# Patient Record
Sex: FEMALE | Race: ASIAN | NOT HISPANIC OR LATINO | Employment: FULL TIME | URBAN - METROPOLITAN AREA
[De-identification: names, ages, dates, MRNs, and addresses within clinical notes are randomized per-mention and may not be internally consistent; named-entity substitution may affect disease eponyms.]

---

## 2022-10-27 ENCOUNTER — TELEPHONE (OUTPATIENT)
Dept: SCHEDULING | Facility: IMAGING CENTER | Age: 42
End: 2022-10-27

## 2022-11-04 SDOH — HEALTH STABILITY: PHYSICAL HEALTH: ON AVERAGE, HOW MANY DAYS PER WEEK DO YOU ENGAGE IN MODERATE TO STRENUOUS EXERCISE (LIKE A BRISK WALK)?: 3 DAYS

## 2022-11-04 SDOH — ECONOMIC STABILITY: HOUSING INSECURITY: IN THE LAST 12 MONTHS, HOW MANY PLACES HAVE YOU LIVED?: 1

## 2022-11-04 SDOH — ECONOMIC STABILITY: INCOME INSECURITY: HOW HARD IS IT FOR YOU TO PAY FOR THE VERY BASICS LIKE FOOD, HOUSING, MEDICAL CARE, AND HEATING?: NOT HARD AT ALL

## 2022-11-04 SDOH — ECONOMIC STABILITY: TRANSPORTATION INSECURITY
IN THE PAST 12 MONTHS, HAS LACK OF RELIABLE TRANSPORTATION KEPT YOU FROM MEDICAL APPOINTMENTS, MEETINGS, WORK OR FROM GETTING THINGS NEEDED FOR DAILY LIVING?: NO

## 2022-11-04 SDOH — ECONOMIC STABILITY: TRANSPORTATION INSECURITY
IN THE PAST 12 MONTHS, HAS THE LACK OF TRANSPORTATION KEPT YOU FROM MEDICAL APPOINTMENTS OR FROM GETTING MEDICATIONS?: NO

## 2022-11-04 SDOH — ECONOMIC STABILITY: INCOME INSECURITY: IN THE LAST 12 MONTHS, WAS THERE A TIME WHEN YOU WERE NOT ABLE TO PAY THE MORTGAGE OR RENT ON TIME?: NO

## 2022-11-04 SDOH — ECONOMIC STABILITY: HOUSING INSECURITY
IN THE LAST 12 MONTHS, WAS THERE A TIME WHEN YOU DID NOT HAVE A STEADY PLACE TO SLEEP OR SLEPT IN A SHELTER (INCLUDING NOW)?: NO

## 2022-11-04 SDOH — HEALTH STABILITY: PHYSICAL HEALTH: ON AVERAGE, HOW MANY MINUTES DO YOU ENGAGE IN EXERCISE AT THIS LEVEL?: 30 MIN

## 2022-11-04 SDOH — ECONOMIC STABILITY: FOOD INSECURITY: WITHIN THE PAST 12 MONTHS, YOU WORRIED THAT YOUR FOOD WOULD RUN OUT BEFORE YOU GOT MONEY TO BUY MORE.: NEVER TRUE

## 2022-11-04 SDOH — ECONOMIC STABILITY: TRANSPORTATION INSECURITY
IN THE PAST 12 MONTHS, HAS LACK OF TRANSPORTATION KEPT YOU FROM MEETINGS, WORK, OR FROM GETTING THINGS NEEDED FOR DAILY LIVING?: NO

## 2022-11-04 SDOH — HEALTH STABILITY: MENTAL HEALTH
STRESS IS WHEN SOMEONE FEELS TENSE, NERVOUS, ANXIOUS, OR CAN'T SLEEP AT NIGHT BECAUSE THEIR MIND IS TROUBLED. HOW STRESSED ARE YOU?: ONLY A LITTLE

## 2022-11-04 SDOH — ECONOMIC STABILITY: FOOD INSECURITY: WITHIN THE PAST 12 MONTHS, THE FOOD YOU BOUGHT JUST DIDN'T LAST AND YOU DIDN'T HAVE MONEY TO GET MORE.: NEVER TRUE

## 2022-11-04 ASSESSMENT — SOCIAL DETERMINANTS OF HEALTH (SDOH)
HOW OFTEN DO YOU HAVE SIX OR MORE DRINKS ON ONE OCCASION: NEVER
DO YOU BELONG TO ANY CLUBS OR ORGANIZATIONS SUCH AS CHURCH GROUPS UNIONS, FRATERNAL OR ATHLETIC GROUPS, OR SCHOOL GROUPS?: NO
HOW OFTEN DO YOU HAVE A DRINK CONTAINING ALCOHOL: 2-4 TIMES A MONTH
HOW HARD IS IT FOR YOU TO PAY FOR THE VERY BASICS LIKE FOOD, HOUSING, MEDICAL CARE, AND HEATING?: NOT HARD AT ALL
DO YOU BELONG TO ANY CLUBS OR ORGANIZATIONS SUCH AS CHURCH GROUPS UNIONS, FRATERNAL OR ATHLETIC GROUPS, OR SCHOOL GROUPS?: NO
HOW OFTEN DO YOU GET TOGETHER WITH FRIENDS OR RELATIVES?: TWICE A WEEK
IN A TYPICAL WEEK, HOW MANY TIMES DO YOU TALK ON THE PHONE WITH FAMILY, FRIENDS, OR NEIGHBORS?: TWICE A WEEK
HOW MANY DRINKS CONTAINING ALCOHOL DO YOU HAVE ON A TYPICAL DAY WHEN YOU ARE DRINKING: 1 OR 2
HOW OFTEN DO YOU GET TOGETHER WITH FRIENDS OR RELATIVES?: TWICE A WEEK
HOW OFTEN DO YOU ATTENT MEETINGS OF THE CLUB OR ORGANIZATION YOU BELONG TO?: NEVER
IN A TYPICAL WEEK, HOW MANY TIMES DO YOU TALK ON THE PHONE WITH FAMILY, FRIENDS, OR NEIGHBORS?: TWICE A WEEK
HOW OFTEN DO YOU ATTENT MEETINGS OF THE CLUB OR ORGANIZATION YOU BELONG TO?: NEVER
WITHIN THE PAST 12 MONTHS, YOU WORRIED THAT YOUR FOOD WOULD RUN OUT BEFORE YOU GOT THE MONEY TO BUY MORE: NEVER TRUE
HOW OFTEN DO YOU ATTEND CHURCH OR RELIGIOUS SERVICES?: NEVER
HOW OFTEN DO YOU ATTEND CHURCH OR RELIGIOUS SERVICES?: NEVER

## 2022-11-04 ASSESSMENT — LIFESTYLE VARIABLES
HOW OFTEN DO YOU HAVE SIX OR MORE DRINKS ON ONE OCCASION: NEVER
HOW OFTEN DO YOU HAVE A DRINK CONTAINING ALCOHOL: 2-4 TIMES A MONTH
AUDIT-C TOTAL SCORE: 2
HOW MANY STANDARD DRINKS CONTAINING ALCOHOL DO YOU HAVE ON A TYPICAL DAY: 1 OR 2
SKIP TO QUESTIONS 9-10: 1

## 2022-11-07 ENCOUNTER — OFFICE VISIT (OUTPATIENT)
Dept: MEDICAL GROUP | Facility: MEDICAL CENTER | Age: 42
End: 2022-11-07
Payer: COMMERCIAL

## 2022-11-07 VITALS
BODY MASS INDEX: 25.39 KG/M2 | HEART RATE: 78 BPM | DIASTOLIC BLOOD PRESSURE: 62 MMHG | TEMPERATURE: 98.4 F | OXYGEN SATURATION: 95 % | HEIGHT: 63 IN | WEIGHT: 143.3 LBS | SYSTOLIC BLOOD PRESSURE: 102 MMHG

## 2022-11-07 DIAGNOSIS — Z01.84 IMMUNITY STATUS TESTING: ICD-10-CM

## 2022-11-07 DIAGNOSIS — R53.83 OTHER FATIGUE: ICD-10-CM

## 2022-11-07 DIAGNOSIS — F32.0 CURRENT MILD EPISODE OF MAJOR DEPRESSIVE DISORDER WITHOUT PRIOR EPISODE (HCC): ICD-10-CM

## 2022-11-07 DIAGNOSIS — Z13.220 SCREENING FOR LIPID DISORDERS: ICD-10-CM

## 2022-11-07 DIAGNOSIS — R07.89 STERNAL PAIN: ICD-10-CM

## 2022-11-07 DIAGNOSIS — Z13.1 SCREENING FOR DIABETES MELLITUS: ICD-10-CM

## 2022-11-07 DIAGNOSIS — Z11.59 NEED FOR HEPATITIS C SCREENING TEST: ICD-10-CM

## 2022-11-07 PROBLEM — F32.1 CURRENT MODERATE EPISODE OF MAJOR DEPRESSIVE DISORDER WITHOUT PRIOR EPISODE (HCC): Status: ACTIVE | Noted: 2022-11-07

## 2022-11-07 PROCEDURE — 99204 OFFICE O/P NEW MOD 45 MIN: CPT | Performed by: FAMILY MEDICINE

## 2022-11-07 RX ORDER — BUPROPION HYDROCHLORIDE 300 MG/1
300 TABLET ORAL EVERY MORNING
COMMUNITY

## 2022-11-07 ASSESSMENT — ENCOUNTER SYMPTOMS
FOCAL WEAKNESS: 0
FEVER: 0
ABDOMINAL PAIN: 0
DIARRHEA: 0
CONSTIPATION: 0
VOMITING: 0
CHILLS: 0
BLOOD IN STOOL: 0
COUGH: 0
SHORTNESS OF BREATH: 0
NAUSEA: 0
SORE THROAT: 0
HEADACHES: 0
WEAKNESS: 0
WHEEZING: 0
PALPITATIONS: 0

## 2022-11-07 ASSESSMENT — PATIENT HEALTH QUESTIONNAIRE - PHQ9
7. TROUBLE CONCENTRATING ON THINGS, SUCH AS READING THE NEWSPAPER OR WATCHING TELEVISION: SEVERAL DAYS
SUM OF ALL RESPONSES TO PHQ QUESTIONS 1-9: 2
CLINICAL INTERPRETATION OF PHQ2 SCORE: 0
2. FEELING DOWN, DEPRESSED, IRRITABLE, OR HOPELESS: NOT AT ALL
1. LITTLE INTEREST OR PLEASURE IN DOING THINGS: NOT AT ALL
5. POOR APPETITE OR OVEREATING: NOT AT ALL
6. FEELING BAD ABOUT YOURSELF - OR THAT YOU ARE A FAILURE OR HAVE LET YOURSELF OR YOUR FAMILY DOWN: NOT AL ALL
8. MOVING OR SPEAKING SO SLOWLY THAT OTHER PEOPLE COULD HAVE NOTICED. OR THE OPPOSITE, BEING SO FIGETY OR RESTLESS THAT YOU HAVE BEEN MOVING AROUND A LOT MORE THAN USUAL: NOT AT ALL
4. FEELING TIRED OR HAVING LITTLE ENERGY: SEVERAL DAYS
SUM OF ALL RESPONSES TO PHQ9 QUESTIONS 1 AND 2: 0
9. THOUGHTS THAT YOU WOULD BE BETTER OFF DEAD, OR OF HURTING YOURSELF: NOT AT ALL
3. TROUBLE FALLING OR STAYING ASLEEP OR SLEEPING TOO MUCH: NOT AT ALL

## 2022-11-07 NOTE — ASSESSMENT & PLAN NOTE
Chronic problem, stable, continue Wellbutrin 300 mg daily, continue to follow-up with psychiatry for management of this condition.

## 2022-11-07 NOTE — PROGRESS NOTES
FAMILY MEDICINE VISIT                                                               Chief complaint::Diagnoses of Current mild episode of major depressive disorder without prior episode (HCC), Sternal pain, Other fatigue, Screening for diabetes mellitus, Screening for lipid disorders, Immunity status testing, and Need for hepatitis C screening test were pertinent to this visit.    History of present illness: Charlette Gomez is a 42 y.o. female who presented to establish care, fatigue, sternal pain.      Problem   Current Mild Episode of Major Depressive Disorder Without Prior Episode (Hcc)    She is currently seeing psychiatry for depression management.  She is on Wellbutrin 300 mg daily.  She is doing well with this medication.  No side effects with this medication.    Depression Screening    Little interest or pleasure in doing things?  0 - not at all  Feeling down, depressed , or hopeless? 0 - not at all  Patient Health Questionnaire Score: 0    If depressive symptoms identified deferred to follow up visit unless specifically addressed in assesment and plan.      Interpretation of PHQ-9 Total Score   Score Severity   1-4 Minimal Depression   5-9 Mild Depression   10-14 Moderate Depression   15-19 Moderately Severe Depression   20-27 Severe Depression       Sternal Pain    She reports that she got COVID 3 months ago.  She has been experiencing sternal pain will sometimes when she takes deep breaths.  Reports that sometimes she gets pain if she palpates that area.  Denies any direct trauma to her chest area.  No other associated symptoms.  No previous lung problems.     Other Fatigue    She reports that she has history of vitamin D deficiency.  She has fatigue symptoms.  Would like to get checked for blood work.  Has family history of thyroid disease in mother          Review of systems:     Review of Systems   Constitutional:  Positive for malaise/fatigue. Negative for chills and fever.   HENT:  Negative for  "congestion, ear discharge, ear pain, hearing loss and sore throat.    Respiratory:  Negative for cough, shortness of breath and wheezing.    Cardiovascular:  Negative for chest pain, palpitations and leg swelling.   Gastrointestinal:  Negative for abdominal pain, blood in stool, constipation, diarrhea, nausea and vomiting.   Musculoskeletal:         Sternal pain   Skin:  Negative for rash.   Neurological:  Negative for focal weakness, weakness and headaches.      Medications and Allergies:     Current Outpatient Medications   Medication Sig Dispense Refill    buPROPion (WELLBUTRIN XL) 300 MG XL tablet Take 1 Tablet by mouth every morning.       No current facility-administered medications for this visit.          Vitals:    /62 (BP Location: Left arm, Patient Position: Sitting, BP Cuff Size: Adult)   Pulse 78   Temp 36.9 °C (98.4 °F) (Temporal)   Ht 1.6 m (5' 3\")   Wt 65 kg (143 lb 4.8 oz)   SpO2 95%  Body mass index is 25.38 kg/m².    Physical Exam:     Physical Exam  Constitutional:       General: She is not in acute distress.     Appearance: She is not diaphoretic.   HENT:      Head: Normocephalic and atraumatic.      Right Ear: Tympanic membrane, ear canal and external ear normal.      Left Ear: Tympanic membrane, ear canal and external ear normal.      Nose: Nose normal.      Mouth/Throat:      Mouth: Mucous membranes are moist.      Pharynx: No oropharyngeal exudate or posterior oropharyngeal erythema.   Eyes:      General:         Right eye: No discharge.         Left eye: No discharge.      Conjunctiva/sclera: Conjunctivae normal.   Neck:      Thyroid: No thyromegaly.   Cardiovascular:      Rate and Rhythm: Normal rate and regular rhythm.      Heart sounds: Normal heart sounds. No murmur heard.  Pulmonary:      Effort: Pulmonary effort is normal. No respiratory distress.      Breath sounds: Normal breath sounds. No wheezing or rales.   Musculoskeletal:         General: No deformity. Normal range " of motion.      Cervical back: Neck supple.   Skin:     General: Skin is warm.      Findings: No rash.   Neurological:      General: No focal deficit present.      Mental Status: She is alert. Mental status is at baseline.      Gait: Gait is intact.   Psychiatric:         Mood and Affect: Mood and affect normal.         Judgment: Judgment normal.          Assessment/Plan:         Problem List Items Addressed This Visit       Sternal pain     New problem, check chest x-ray as she recently had COVID.  Symptoms are consistent with costochondritis.  If pain gets worse, can take ibuprofen that will help with these symptoms         Relevant Orders    DX-CHEST-2 VIEWS    Other fatigue     New problem, uncontrolled, check vitamin D, metabolic panel, thyroid function test.  We will recommend vitamin supplementation based on these results         Relevant Orders    TSH WITH REFLEX TO FT4    VITAMIN D,25 HYDROXY (DEFICIENCY)    Current mild episode of major depressive disorder without prior episode (HCC)     Chronic problem, stable, continue Wellbutrin 300 mg daily, continue to follow-up with psychiatry for management of this condition.         Relevant Medications    buPROPion (WELLBUTRIN XL) 300 MG XL tablet     Other Visit Diagnoses       Screening for diabetes mellitus        Relevant Orders    HEMOGLOBIN A1C    Screening for lipid disorders        Relevant Orders    Comp Metabolic Panel    Lipid Profile    Immunity status testing        Relevant Orders    HEP B SURFACE AB    Need for hepatitis C screening test        Relevant Orders    HEP C VIRUS ANTIBODY           Discussed with patient about due vaccines, will give tetanus vaccine at next visit.  Had Pap smear done, request records.    Please note that this dictation was created using voice recognition software. I have made every reasonable attempt to correct obvious errors, but I expect that there are errors of grammar and possibly content that I did not discover  before finalizing the note.    Follow up in 2 months for annual physical and lab follow-up

## 2022-11-07 NOTE — ASSESSMENT & PLAN NOTE
New problem, uncontrolled, check vitamin D, metabolic panel, thyroid function test.  We will recommend vitamin supplementation based on these results

## 2022-11-07 NOTE — ASSESSMENT & PLAN NOTE
New problem, check chest x-ray as she recently had COVID.  Symptoms are consistent with costochondritis.  If pain gets worse, can take ibuprofen that will help with these symptoms

## 2022-11-07 NOTE — LETTER
[facility]  Jared Barrios M.D.  [address]  [place] [address]  Fax: [phone]   Authorization for Release/Disclosure of   Protected Health Information   Name: LAURIE VEE : 1980 SSN: [id number]   Address: [address] [place] [address] [place] [address] Phone:    [phone] (work)   I authorize the entity listed below to release/disclose the PHI below to:   [facility]/Jared Barrios M.D. and Jared Barrios M.D.   Provider or Entity Name:     Address   City, State, Zip   Phone:      Fax:     Reason for request: continuity of care   Information to be released:    [  ] LAST COLONOSCOPY,  including any PATH REPORT and follow-up  [  ] LAST FIT/COLOGUARD RESULT [  ] LAST DEXA  [  ] LAST MAMMOGRAM  [  ] LAST PAP  [  ] LAST LABS [  ] RETINA EXAM REPORT  [  ] IMMUNIZATION RECORDS  [  ] Release all info      [  ] Check here and initial the line next to each item to release ALL health information INCLUDING  _____ Care and treatment for drug and / or alcohol abuse  _____ HIV testing, infection status, or AIDS  _____ Genetic Testing    DATES OF SERVICE OR TIME PERIOD TO BE DISCLOSED: _____________  I understand and acknowledge that:  * This Authorization may be revoked at any time by you in writing, except if your health information has already been used or disclosed.  * Your health information that will be used or disclosed as a result of you signing this authorization could be re-disclosed by the recipient. If this occurs, your re-disclosed health information may no longer be protected by State or Federal laws.  * You may refuse to sign this Authorization. Your refusal will not affect your ability to obtain treatment.  * This Authorization becomes effective upon signing and will  on (date) __________.      If no date is indicated, this Authorization will  one (1) year from the signature date.    Name: Laurie Vee    Signature:   Date:     2022       PLEASE FAX REQUESTED  RECORDS BACK TO: [phone]

## 2022-12-30 ENCOUNTER — HOSPITAL ENCOUNTER (OUTPATIENT)
Dept: LAB | Facility: MEDICAL CENTER | Age: 42
End: 2022-12-30
Attending: FAMILY MEDICINE
Payer: COMMERCIAL

## 2022-12-30 DIAGNOSIS — Z13.1 SCREENING FOR DIABETES MELLITUS: ICD-10-CM

## 2022-12-30 DIAGNOSIS — Z11.59 NEED FOR HEPATITIS C SCREENING TEST: ICD-10-CM

## 2022-12-30 DIAGNOSIS — Z01.84 IMMUNITY STATUS TESTING: ICD-10-CM

## 2022-12-30 DIAGNOSIS — Z13.220 SCREENING FOR LIPID DISORDERS: ICD-10-CM

## 2022-12-30 DIAGNOSIS — R53.83 OTHER FATIGUE: ICD-10-CM

## 2022-12-30 LAB
25(OH)D3 SERPL-MCNC: 24 NG/ML (ref 30–100)
ALBUMIN SERPL BCP-MCNC: 4.3 G/DL (ref 3.2–4.9)
ALBUMIN/GLOB SERPL: 1.2 G/DL
ALP SERPL-CCNC: 72 U/L (ref 30–99)
ALT SERPL-CCNC: 16 U/L (ref 2–50)
ANION GAP SERPL CALC-SCNC: 8 MMOL/L (ref 7–16)
AST SERPL-CCNC: 18 U/L (ref 12–45)
BILIRUB SERPL-MCNC: 0.7 MG/DL (ref 0.1–1.5)
BUN SERPL-MCNC: 12 MG/DL (ref 8–22)
CALCIUM ALBUM COR SERPL-MCNC: 9.5 MG/DL (ref 8.5–10.5)
CALCIUM SERPL-MCNC: 9.7 MG/DL (ref 8.5–10.5)
CHLORIDE SERPL-SCNC: 103 MMOL/L (ref 96–112)
CHOLEST SERPL-MCNC: 157 MG/DL (ref 100–199)
CO2 SERPL-SCNC: 27 MMOL/L (ref 20–33)
CREAT SERPL-MCNC: 0.68 MG/DL (ref 0.5–1.4)
EST. AVERAGE GLUCOSE BLD GHB EST-MCNC: 111 MG/DL
GFR SERPLBLD CREATININE-BSD FMLA CKD-EPI: 112 ML/MIN/1.73 M 2
GLOBULIN SER CALC-MCNC: 3.6 G/DL (ref 1.9–3.5)
GLUCOSE SERPL-MCNC: 95 MG/DL (ref 65–99)
HBA1C MFR BLD: 5.5 % (ref 4–5.6)
HBV SURFACE AB SERPL IA-ACNC: ABNORMAL MIU/ML (ref 0–10)
HCV AB SER QL: NORMAL
HDLC SERPL-MCNC: 57 MG/DL
LDLC SERPL CALC-MCNC: 88 MG/DL
POTASSIUM SERPL-SCNC: 4.4 MMOL/L (ref 3.6–5.5)
PROT SERPL-MCNC: 7.9 G/DL (ref 6–8.2)
SODIUM SERPL-SCNC: 138 MMOL/L (ref 135–145)
TRIGL SERPL-MCNC: 62 MG/DL (ref 0–149)
TSH SERPL DL<=0.005 MIU/L-ACNC: 3.08 UIU/ML (ref 0.38–5.33)

## 2022-12-30 PROCEDURE — 86803 HEPATITIS C AB TEST: CPT

## 2022-12-30 PROCEDURE — 82306 VITAMIN D 25 HYDROXY: CPT

## 2022-12-30 PROCEDURE — 80053 COMPREHEN METABOLIC PANEL: CPT

## 2022-12-30 PROCEDURE — 86706 HEP B SURFACE ANTIBODY: CPT

## 2022-12-30 PROCEDURE — 80061 LIPID PANEL: CPT

## 2022-12-30 PROCEDURE — 84443 ASSAY THYROID STIM HORMONE: CPT

## 2022-12-30 PROCEDURE — 83036 HEMOGLOBIN GLYCOSYLATED A1C: CPT

## 2022-12-30 PROCEDURE — 36415 COLL VENOUS BLD VENIPUNCTURE: CPT

## 2023-06-17 ENCOUNTER — HOSPITAL ENCOUNTER (OUTPATIENT)
Dept: RADIOLOGY | Facility: MEDICAL CENTER | Age: 43
End: 2023-06-17
Attending: FAMILY MEDICINE
Payer: COMMERCIAL

## 2023-06-17 DIAGNOSIS — R07.89 STERNAL PAIN: ICD-10-CM

## 2023-06-17 PROCEDURE — 71046 X-RAY EXAM CHEST 2 VIEWS: CPT

## 2023-07-03 ENCOUNTER — OFFICE VISIT (OUTPATIENT)
Dept: MEDICAL GROUP | Facility: MEDICAL CENTER | Age: 43
End: 2023-07-03
Payer: COMMERCIAL

## 2023-07-03 VITALS
HEART RATE: 90 BPM | WEIGHT: 142.09 LBS | HEIGHT: 63 IN | DIASTOLIC BLOOD PRESSURE: 50 MMHG | OXYGEN SATURATION: 100 % | SYSTOLIC BLOOD PRESSURE: 90 MMHG | TEMPERATURE: 98.1 F | BODY MASS INDEX: 25.18 KG/M2

## 2023-07-03 DIAGNOSIS — M79.672 PAIN IN BOTH FEET: ICD-10-CM

## 2023-07-03 DIAGNOSIS — M79.671 PAIN IN BOTH FEET: ICD-10-CM

## 2023-07-03 DIAGNOSIS — Z13.1 SCREENING FOR DIABETES MELLITUS: ICD-10-CM

## 2023-07-03 DIAGNOSIS — E55.9 VITAMIN D INSUFFICIENCY: ICD-10-CM

## 2023-07-03 DIAGNOSIS — Z11.4 SCREENING FOR HIV (HUMAN IMMUNODEFICIENCY VIRUS): ICD-10-CM

## 2023-07-03 DIAGNOSIS — Z13.220 SCREENING FOR LIPID DISORDERS: ICD-10-CM

## 2023-07-03 DIAGNOSIS — Z12.31 ENCOUNTER FOR SCREENING MAMMOGRAM FOR MALIGNANT NEOPLASM OF BREAST: ICD-10-CM

## 2023-07-03 DIAGNOSIS — Z23 NEED FOR VACCINATION: ICD-10-CM

## 2023-07-03 DIAGNOSIS — Z00.00 ANNUAL PHYSICAL EXAM: ICD-10-CM

## 2023-07-03 DIAGNOSIS — N92.6 IRREGULAR MENSTRUAL CYCLE: ICD-10-CM

## 2023-07-03 PROCEDURE — 99214 OFFICE O/P EST MOD 30 MIN: CPT | Mod: 25 | Performed by: FAMILY MEDICINE

## 2023-07-03 PROCEDURE — 3074F SYST BP LT 130 MM HG: CPT | Performed by: FAMILY MEDICINE

## 2023-07-03 PROCEDURE — 3078F DIAST BP <80 MM HG: CPT | Performed by: FAMILY MEDICINE

## 2023-07-03 PROCEDURE — 90715 TDAP VACCINE 7 YRS/> IM: CPT | Performed by: FAMILY MEDICINE

## 2023-07-03 PROCEDURE — 99396 PREV VISIT EST AGE 40-64: CPT | Mod: 25 | Performed by: FAMILY MEDICINE

## 2023-07-03 PROCEDURE — 90471 IMMUNIZATION ADMIN: CPT | Performed by: FAMILY MEDICINE

## 2023-07-03 ASSESSMENT — ENCOUNTER SYMPTOMS
EYE DISCHARGE: 0
DEPRESSION: 0
CHILLS: 0
ABDOMINAL PAIN: 0
CONSTIPATION: 0
VOMITING: 0
NAUSEA: 0
MYALGIAS: 0
EYE PAIN: 0
SHORTNESS OF BREATH: 0
HEADACHES: 0
COUGH: 0
NERVOUS/ANXIOUS: 0
DIZZINESS: 0
SINUS PAIN: 0
DIARRHEA: 0
SENSORY CHANGE: 0
HEMOPTYSIS: 0
PALPITATIONS: 0
FEVER: 0
FOCAL WEAKNESS: 0
WHEEZING: 0
EYE REDNESS: 0
SPUTUM PRODUCTION: 0

## 2023-07-03 ASSESSMENT — PATIENT HEALTH QUESTIONNAIRE - PHQ9: CLINICAL INTERPRETATION OF PHQ2 SCORE: 0

## 2023-07-03 NOTE — PROGRESS NOTES
43 y.o. female for annual routine physical exam, labs, pain in both feet, sometimes irregular menstrual cycles    Chief Complaint.  Chief Complaint   Patient presents with    Annual Exam       History of present illness:      Her last vitamin D level came back at 24.  She is taking multivitamins.  She is getting irregular menstrual cycles sometimes, would like to get checked for hormone levels.  She is having pain in both feet but she has flat feet.  Reports that when she does stationary bike then her feet are hurting more.  Denies any direct trauma to her feet.  Would like to see specialist for this.    Last Pap Smear: April 22  History of abnormal pap smears.  None  Gyn Surgery: none  Sexual history: currently sexually active    Her menstrual cycles are sometimes irregular    Health Maintenance  Advanced directive: n/a  Osteoporosis Screen/ DEXA: n/a  PT/vit D for falls prevention: n/a   Cholesterol Screening:   Lab Results   Component Value Date/Time    CHOLSTRLTOT 157 12/30/2022 0915    TRIGLYCERIDE 62 12/30/2022 0915    HDL 57 12/30/2022 0915    LDL 88 12/30/2022 0915      Diabetes Screening:   Lab Results   Component Value Date/Time    HBA1C 5.5 12/30/2022 09:15 AM       AAA Screening (65 to 74 year male): n/a   Aspirin Use: N/A    Below anticipatory guidance discussed patient  Diet: Counseled regarding eating healthy diet  Exercise: Counseled regarding exercise  Substance Abuse: None  Safe in relationship.  Yes  Seat belts, bike helmet, gun safety discussed.  Sun protection use discussed    Cancer screening  Colorectal Cancer Screening: Discussed colon cancer screening start at age 45  Lung Cancer Screening: She does not smoke  Cervical Cancer Screening: Up-to-date for Pap smear, repeat in 3 years  Breast Cancer Screening: Ordered mammogram      Infectious disease screening/Immunizations  --STI Screening: none  --HIV Screening: Ordered HIV test  --Hepatitis C Screening: Last hep C test  "negative  --Immunizations: Tdap vaccination given today.        Review of systems:    Review of Systems   Constitutional:  Negative for chills, fever and malaise/fatigue.   HENT:  Negative for ear discharge, ear pain, hearing loss and sinus pain.    Eyes:  Negative for pain, discharge and redness.   Respiratory:  Negative for cough, hemoptysis, sputum production, shortness of breath and wheezing.    Cardiovascular:  Negative for chest pain, palpitations and leg swelling.   Gastrointestinal:  Negative for abdominal pain, constipation, diarrhea, nausea and vomiting.   Genitourinary:  Negative for dysuria, frequency and urgency.   Musculoskeletal:  Negative for joint pain and myalgias.        Foot pain   Skin:  Negative for itching and rash.   Neurological:  Negative for dizziness, sensory change, focal weakness and headaches.   Psychiatric/Behavioral:  Negative for depression. The patient is not nervous/anxious.         Medications and Allergies:     Current Outpatient Medications   Medication Sig Dispense Refill    buPROPion (WELLBUTRIN XL) 300 MG XL tablet Take 1 Tablet by mouth every morning.       No current facility-administered medications for this visit.        No Known Allergies    Vitals:    BP 90/50 (BP Location: Left arm, Patient Position: Sitting, BP Cuff Size: Adult)   Pulse 90   Temp 36.7 °C (98.1 °F) (Temporal)   Ht 1.6 m (5' 3\")   Wt 64.4 kg (142 lb 1.4 oz)   SpO2 100%  Body mass index is 25.17 kg/m².    Physical Exam:   Physical Exam  Constitutional:       Appearance: Normal appearance. She is well-developed and well-groomed.   HENT:      Head: Normocephalic and atraumatic.      Right Ear: Tympanic membrane, ear canal and external ear normal.      Left Ear: Tympanic membrane, ear canal and external ear normal.      Nose: Nose normal.      Mouth/Throat:      Mouth: Mucous membranes are moist.      Pharynx: No oropharyngeal exudate or posterior oropharyngeal erythema.   Eyes:      General:         " Right eye: No discharge.         Left eye: No discharge.      Conjunctiva/sclera: Conjunctivae normal.   Neck:      Thyroid: No thyromegaly.   Cardiovascular:      Rate and Rhythm: Normal rate and regular rhythm.      Heart sounds: Normal heart sounds. No murmur heard.  Pulmonary:      Effort: Pulmonary effort is normal. No respiratory distress.      Breath sounds: Normal breath sounds. No wheezing or rales.   Abdominal:      General: Bowel sounds are normal. There is no distension.      Palpations: Abdomen is soft.      Tenderness: There is no abdominal tenderness. There is no guarding.   Musculoskeletal:         General: Normal range of motion.      Cervical back: Neck supple.      Right lower leg: No edema.      Left lower leg: No edema.   Skin:     General: Skin is warm.      Findings: No rash.   Neurological:      General: No focal deficit present.      Mental Status: She is alert. Mental status is at baseline.      Gait: Gait is intact.   Psychiatric:         Mood and Affect: Mood and affect normal.         Behavior: Behavior normal.          Labs:  I reviewed recent labs (dated: 12/30/2022) with patient.     Assessment/Plan:    Charlette was seen today for annual exam.    Diagnoses and all orders for this visit:    Annual physical exam  Ordered mammogram, up-to-date for Pap smear, Tdap vaccination given today.  Anticipatory guidance as above in note.    Screening for diabetes mellitus  -     HEMOGLOBIN A1C; Future  -     Comp Metabolic Panel; Future    Screening for lipid disorders  -     Lipid Profile; Future    Need for vaccination  -     Tdap =>8yo IM    Encounter for screening mammogram for malignant neoplasm of breast  -     MA-SCREENING MAMMO BILAT W/TOMOSYNTHESIS W/CAD; Future    Screening for HIV (human immunodeficiency virus)  -     HIV AG/AB COMBO ASSAY SCREENING; Future    Vitamin D insufficiency:  New problem, unstable, recommended to take vitamin D 2000 international units daily.  -     VITAMIN D,25  HYDROXY (DEFICIENCY); Future    Irregular menstrual cycle  New problem, check estradiol and FSH level.  -     ESTRADIOL; Future  -     FSH; Future    Pain in both feet  New problem, unstable, referral to podiatry for further evaluation.  Pain is likely due to flat feet.  She will benefit from foot insoles.    -     Referral to Podiatry        Follow up in 1 year for annual physical, sooner as needed.

## 2023-12-27 ENCOUNTER — HOSPITAL ENCOUNTER (OUTPATIENT)
Dept: RADIOLOGY | Facility: MEDICAL CENTER | Age: 43
End: 2023-12-27
Attending: FAMILY MEDICINE
Payer: COMMERCIAL

## 2023-12-27 DIAGNOSIS — Z12.31 ENCOUNTER FOR SCREENING MAMMOGRAM FOR MALIGNANT NEOPLASM OF BREAST: ICD-10-CM

## 2023-12-27 PROCEDURE — 77063 BREAST TOMOSYNTHESIS BI: CPT | Mod: 50

## 2024-07-06 ENCOUNTER — PATIENT MESSAGE (OUTPATIENT)
Dept: MEDICAL GROUP | Facility: MEDICAL CENTER | Age: 44
End: 2024-07-06
Payer: COMMERCIAL

## 2024-07-06 DIAGNOSIS — E55.9 VITAMIN D INSUFFICIENCY: ICD-10-CM

## 2024-07-06 DIAGNOSIS — Z11.4 SCREENING FOR HIV (HUMAN IMMUNODEFICIENCY VIRUS): ICD-10-CM

## 2024-07-06 DIAGNOSIS — N92.6 IRREGULAR MENSTRUAL CYCLE: ICD-10-CM

## 2024-07-06 DIAGNOSIS — Z13.220 SCREENING FOR LIPID DISORDERS: ICD-10-CM

## 2024-07-06 DIAGNOSIS — Z13.1 SCREENING FOR DIABETES MELLITUS: ICD-10-CM

## 2024-07-08 SDOH — HEALTH STABILITY: PHYSICAL HEALTH: ON AVERAGE, HOW MANY DAYS PER WEEK DO YOU ENGAGE IN MODERATE TO STRENUOUS EXERCISE (LIKE A BRISK WALK)?: 4 DAYS

## 2024-07-08 SDOH — HEALTH STABILITY: PHYSICAL HEALTH: ON AVERAGE, HOW MANY MINUTES DO YOU ENGAGE IN EXERCISE AT THIS LEVEL?: 140 MIN

## 2024-07-08 SDOH — ECONOMIC STABILITY: INCOME INSECURITY: IN THE LAST 12 MONTHS, WAS THERE A TIME WHEN YOU WERE NOT ABLE TO PAY THE MORTGAGE OR RENT ON TIME?: NO

## 2024-07-08 SDOH — ECONOMIC STABILITY: FOOD INSECURITY: WITHIN THE PAST 12 MONTHS, THE FOOD YOU BOUGHT JUST DIDN'T LAST AND YOU DIDN'T HAVE MONEY TO GET MORE.: NEVER TRUE

## 2024-07-08 SDOH — ECONOMIC STABILITY: HOUSING INSECURITY: IN THE LAST 12 MONTHS, HOW MANY PLACES HAVE YOU LIVED?: 1

## 2024-07-08 SDOH — HEALTH STABILITY: MENTAL HEALTH
STRESS IS WHEN SOMEONE FEELS TENSE, NERVOUS, ANXIOUS, OR CAN'T SLEEP AT NIGHT BECAUSE THEIR MIND IS TROUBLED. HOW STRESSED ARE YOU?: NOT AT ALL

## 2024-07-08 SDOH — ECONOMIC STABILITY: INCOME INSECURITY: HOW HARD IS IT FOR YOU TO PAY FOR THE VERY BASICS LIKE FOOD, HOUSING, MEDICAL CARE, AND HEATING?: NOT HARD AT ALL

## 2024-07-08 SDOH — ECONOMIC STABILITY: FOOD INSECURITY: WITHIN THE PAST 12 MONTHS, YOU WORRIED THAT YOUR FOOD WOULD RUN OUT BEFORE YOU GOT MONEY TO BUY MORE.: NEVER TRUE

## 2024-07-08 ASSESSMENT — SOCIAL DETERMINANTS OF HEALTH (SDOH)
HOW OFTEN DO YOU HAVE A DRINK CONTAINING ALCOHOL: 2-3 TIMES A WEEK
HOW OFTEN DO YOU ATTENT MEETINGS OF THE CLUB OR ORGANIZATION YOU BELONG TO?: NEVER
DO YOU BELONG TO ANY CLUBS OR ORGANIZATIONS SUCH AS CHURCH GROUPS UNIONS, FRATERNAL OR ATHLETIC GROUPS, OR SCHOOL GROUPS?: NO
HOW OFTEN DO YOU GET TOGETHER WITH FRIENDS OR RELATIVES?: ONCE A WEEK
HOW OFTEN DO YOU ATTEND CHURCH OR RELIGIOUS SERVICES?: NEVER
HOW MANY DRINKS CONTAINING ALCOHOL DO YOU HAVE ON A TYPICAL DAY WHEN YOU ARE DRINKING: 1 OR 2
IN A TYPICAL WEEK, HOW MANY TIMES DO YOU TALK ON THE PHONE WITH FAMILY, FRIENDS, OR NEIGHBORS?: MORE THAN THREE TIMES A WEEK
HOW OFTEN DO YOU ATTENT MEETINGS OF THE CLUB OR ORGANIZATION YOU BELONG TO?: NEVER
IN THE PAST 12 MONTHS, HAS THE ELECTRIC, GAS, OIL, OR WATER COMPANY THREATENED TO SHUT OFF SERVICE IN YOUR HOME?: NO
WITHIN THE PAST 12 MONTHS, YOU WORRIED THAT YOUR FOOD WOULD RUN OUT BEFORE YOU GOT THE MONEY TO BUY MORE: NEVER TRUE
HOW OFTEN DO YOU GET TOGETHER WITH FRIENDS OR RELATIVES?: ONCE A WEEK
HOW OFTEN DO YOU HAVE SIX OR MORE DRINKS ON ONE OCCASION: NEVER
HOW HARD IS IT FOR YOU TO PAY FOR THE VERY BASICS LIKE FOOD, HOUSING, MEDICAL CARE, AND HEATING?: NOT HARD AT ALL
DO YOU BELONG TO ANY CLUBS OR ORGANIZATIONS SUCH AS CHURCH GROUPS UNIONS, FRATERNAL OR ATHLETIC GROUPS, OR SCHOOL GROUPS?: NO
HOW OFTEN DO YOU ATTEND CHURCH OR RELIGIOUS SERVICES?: NEVER
IN A TYPICAL WEEK, HOW MANY TIMES DO YOU TALK ON THE PHONE WITH FAMILY, FRIENDS, OR NEIGHBORS?: MORE THAN THREE TIMES A WEEK

## 2024-07-08 ASSESSMENT — LIFESTYLE VARIABLES
SKIP TO QUESTIONS 9-10: 1
HOW OFTEN DO YOU HAVE SIX OR MORE DRINKS ON ONE OCCASION: NEVER
AUDIT-C TOTAL SCORE: 3
HOW MANY STANDARD DRINKS CONTAINING ALCOHOL DO YOU HAVE ON A TYPICAL DAY: 1 OR 2
HOW OFTEN DO YOU HAVE A DRINK CONTAINING ALCOHOL: 2-3 TIMES A WEEK

## 2024-07-10 ENCOUNTER — HOSPITAL ENCOUNTER (OUTPATIENT)
Dept: LAB | Facility: MEDICAL CENTER | Age: 44
End: 2024-07-10
Attending: FAMILY MEDICINE
Payer: COMMERCIAL

## 2024-07-10 DIAGNOSIS — Z13.1 SCREENING FOR DIABETES MELLITUS: ICD-10-CM

## 2024-07-10 DIAGNOSIS — E55.9 VITAMIN D INSUFFICIENCY: ICD-10-CM

## 2024-07-10 DIAGNOSIS — N92.6 IRREGULAR MENSTRUAL CYCLE: ICD-10-CM

## 2024-07-10 DIAGNOSIS — Z11.4 SCREENING FOR HIV (HUMAN IMMUNODEFICIENCY VIRUS): ICD-10-CM

## 2024-07-10 DIAGNOSIS — Z13.220 SCREENING FOR LIPID DISORDERS: ICD-10-CM

## 2024-07-10 LAB
25(OH)D3 SERPL-MCNC: 22 NG/ML (ref 30–100)
ALBUMIN SERPL BCP-MCNC: 3.9 G/DL (ref 3.2–4.9)
ALBUMIN/GLOB SERPL: 1.2 G/DL
ALP SERPL-CCNC: 64 U/L (ref 30–99)
ALT SERPL-CCNC: 10 U/L (ref 2–50)
ANION GAP SERPL CALC-SCNC: 10 MMOL/L (ref 7–16)
AST SERPL-CCNC: 20 U/L (ref 12–45)
BASOPHILS # BLD AUTO: 0.5 % (ref 0–1.8)
BASOPHILS # BLD: 0.03 K/UL (ref 0–0.12)
BILIRUB SERPL-MCNC: 0.4 MG/DL (ref 0.1–1.5)
BUN SERPL-MCNC: 10 MG/DL (ref 8–22)
CALCIUM ALBUM COR SERPL-MCNC: 9.5 MG/DL (ref 8.5–10.5)
CALCIUM SERPL-MCNC: 9.4 MG/DL (ref 8.5–10.5)
CHLORIDE SERPL-SCNC: 100 MMOL/L (ref 96–112)
CHOLEST SERPL-MCNC: 142 MG/DL (ref 100–199)
CO2 SERPL-SCNC: 26 MMOL/L (ref 20–33)
CREAT SERPL-MCNC: 0.78 MG/DL (ref 0.5–1.4)
EOSINOPHIL # BLD AUTO: 0.06 K/UL (ref 0–0.51)
EOSINOPHIL NFR BLD: 0.9 % (ref 0–6.9)
ERYTHROCYTE [DISTWIDTH] IN BLOOD BY AUTOMATED COUNT: 44.9 FL (ref 35.9–50)
EST. AVERAGE GLUCOSE BLD GHB EST-MCNC: 108 MG/DL
ESTRADIOL SERPL-MCNC: 22.7 PG/ML
FSH SERPL-ACNC: 12 MIU/ML
GFR SERPLBLD CREATININE-BSD FMLA CKD-EPI: 97 ML/MIN/1.73 M 2
GLOBULIN SER CALC-MCNC: 3.2 G/DL (ref 1.9–3.5)
GLUCOSE SERPL-MCNC: 97 MG/DL (ref 65–99)
HBA1C MFR BLD: 5.4 % (ref 4–5.6)
HCT VFR BLD AUTO: 38.4 % (ref 37–47)
HDLC SERPL-MCNC: 43 MG/DL
HGB BLD-MCNC: 12.4 G/DL (ref 12–16)
HIV 1+2 AB+HIV1 P24 AG SERPL QL IA: NORMAL
IMM GRANULOCYTES # BLD AUTO: 0.01 K/UL (ref 0–0.11)
IMM GRANULOCYTES NFR BLD AUTO: 0.2 % (ref 0–0.9)
LDLC SERPL CALC-MCNC: 86 MG/DL
LYMPHOCYTES # BLD AUTO: 1.79 K/UL (ref 1–4.8)
LYMPHOCYTES NFR BLD: 28.1 % (ref 22–41)
MCH RBC QN AUTO: 30.6 PG (ref 27–33)
MCHC RBC AUTO-ENTMCNC: 32.3 G/DL (ref 32.2–35.5)
MCV RBC AUTO: 94.8 FL (ref 81.4–97.8)
MONOCYTES # BLD AUTO: 0.42 K/UL (ref 0–0.85)
MONOCYTES NFR BLD AUTO: 6.6 % (ref 0–13.4)
NEUTROPHILS # BLD AUTO: 4.05 K/UL (ref 1.82–7.42)
NEUTROPHILS NFR BLD: 63.7 % (ref 44–72)
NRBC # BLD AUTO: 0 K/UL
NRBC BLD-RTO: 0 /100 WBC (ref 0–0.2)
PLATELET # BLD AUTO: 255 K/UL (ref 164–446)
PMV BLD AUTO: 10.7 FL (ref 9–12.9)
POTASSIUM SERPL-SCNC: 4.2 MMOL/L (ref 3.6–5.5)
PROT SERPL-MCNC: 7.1 G/DL (ref 6–8.2)
RBC # BLD AUTO: 4.05 M/UL (ref 4.2–5.4)
SODIUM SERPL-SCNC: 136 MMOL/L (ref 135–145)
T4 FREE SERPL-MCNC: 1.31 NG/DL (ref 0.93–1.7)
TRIGL SERPL-MCNC: 63 MG/DL (ref 0–149)
TSH SERPL DL<=0.005 MIU/L-ACNC: 2.08 UIU/ML (ref 0.38–5.33)
WBC # BLD AUTO: 6.4 K/UL (ref 4.8–10.8)

## 2024-07-10 PROCEDURE — 85025 COMPLETE CBC W/AUTO DIFF WBC: CPT

## 2024-07-10 PROCEDURE — 80061 LIPID PANEL: CPT

## 2024-07-10 PROCEDURE — 80053 COMPREHEN METABOLIC PANEL: CPT

## 2024-07-10 PROCEDURE — 83001 ASSAY OF GONADOTROPIN (FSH): CPT

## 2024-07-10 PROCEDURE — 82670 ASSAY OF TOTAL ESTRADIOL: CPT

## 2024-07-10 PROCEDURE — 84443 ASSAY THYROID STIM HORMONE: CPT

## 2024-07-10 PROCEDURE — 87389 HIV-1 AG W/HIV-1&-2 AB AG IA: CPT

## 2024-07-10 PROCEDURE — 82306 VITAMIN D 25 HYDROXY: CPT

## 2024-07-10 PROCEDURE — 84439 ASSAY OF FREE THYROXINE: CPT

## 2024-07-10 PROCEDURE — 36415 COLL VENOUS BLD VENIPUNCTURE: CPT

## 2024-07-10 PROCEDURE — 83036 HEMOGLOBIN GLYCOSYLATED A1C: CPT

## 2024-07-11 ENCOUNTER — OFFICE VISIT (OUTPATIENT)
Dept: MEDICAL GROUP | Facility: MEDICAL CENTER | Age: 44
End: 2024-07-11
Payer: COMMERCIAL

## 2024-07-11 VITALS
WEIGHT: 140 LBS | DIASTOLIC BLOOD PRESSURE: 60 MMHG | SYSTOLIC BLOOD PRESSURE: 90 MMHG | OXYGEN SATURATION: 97 % | BODY MASS INDEX: 24.8 KG/M2 | HEIGHT: 63 IN | TEMPERATURE: 97.3 F | HEART RATE: 102 BPM

## 2024-07-11 DIAGNOSIS — Z12.39 ENCOUNTER FOR BREAST CANCER SCREENING USING NON-MAMMOGRAM MODALITY: ICD-10-CM

## 2024-07-11 DIAGNOSIS — E55.9 VITAMIN D INSUFFICIENCY: ICD-10-CM

## 2024-07-11 DIAGNOSIS — R68.84 JAW PAIN: ICD-10-CM

## 2024-07-11 DIAGNOSIS — R92.30 DENSE BREAST: ICD-10-CM

## 2024-07-11 DIAGNOSIS — Z12.31 ENCOUNTER FOR SCREENING MAMMOGRAM FOR MALIGNANT NEOPLASM OF BREAST: ICD-10-CM

## 2024-07-11 DIAGNOSIS — Z00.00 ANNUAL PHYSICAL EXAM: ICD-10-CM

## 2024-07-11 DIAGNOSIS — Z12.4 SCREENING FOR CERVICAL CANCER: ICD-10-CM

## 2024-07-11 DIAGNOSIS — L98.9 SKIN LESION OF NECK: ICD-10-CM

## 2024-07-11 PROCEDURE — 3074F SYST BP LT 130 MM HG: CPT | Performed by: FAMILY MEDICINE

## 2024-07-11 PROCEDURE — 99213 OFFICE O/P EST LOW 20 MIN: CPT | Mod: 25 | Performed by: FAMILY MEDICINE

## 2024-07-11 PROCEDURE — 99396 PREV VISIT EST AGE 40-64: CPT | Performed by: FAMILY MEDICINE

## 2024-07-11 PROCEDURE — 3078F DIAST BP <80 MM HG: CPT | Performed by: FAMILY MEDICINE

## 2024-07-11 ASSESSMENT — ENCOUNTER SYMPTOMS
DIARRHEA: 0
EYE REDNESS: 0
FOCAL WEAKNESS: 0
SORE THROAT: 0
DEPRESSION: 0
ABDOMINAL PAIN: 0
EYE DISCHARGE: 0
SPUTUM PRODUCTION: 0
MYALGIAS: 0
SINUS PAIN: 0
SHORTNESS OF BREATH: 0
HEMOPTYSIS: 0
CONSTIPATION: 0
SENSORY CHANGE: 0
DIZZINESS: 0
HEADACHES: 0
EYE PAIN: 0
COUGH: 0
NAUSEA: 0
FEVER: 0
WHEEZING: 0
CHILLS: 0
NERVOUS/ANXIOUS: 0
VOMITING: 0
PALPITATIONS: 0

## 2024-07-11 ASSESSMENT — PATIENT HEALTH QUESTIONNAIRE - PHQ9: CLINICAL INTERPRETATION OF PHQ2 SCORE: 0

## 2024-07-11 ASSESSMENT — FIBROSIS 4 INDEX: FIB4 SCORE: 1.04

## 2024-10-16 ENCOUNTER — PATIENT MESSAGE (OUTPATIENT)
Dept: MEDICAL GROUP | Facility: MEDICAL CENTER | Age: 44
End: 2024-10-16
Payer: COMMERCIAL

## 2024-11-15 ENCOUNTER — TELEPHONE (OUTPATIENT)
Dept: OBGYN | Facility: CLINIC | Age: 44
End: 2024-11-15
Payer: COMMERCIAL

## 2024-12-11 ENCOUNTER — PHARMACY VISIT (OUTPATIENT)
Dept: PHARMACY | Facility: MEDICAL CENTER | Age: 44
End: 2024-12-11
Payer: COMMERCIAL

## 2024-12-11 ENCOUNTER — OFFICE VISIT (OUTPATIENT)
Dept: URGENT CARE | Facility: CLINIC | Age: 44
End: 2024-12-11
Payer: COMMERCIAL

## 2024-12-11 VITALS
OXYGEN SATURATION: 99 % | SYSTOLIC BLOOD PRESSURE: 110 MMHG | TEMPERATURE: 97.6 F | HEART RATE: 84 BPM | DIASTOLIC BLOOD PRESSURE: 72 MMHG | HEIGHT: 63 IN | BODY MASS INDEX: 25.12 KG/M2 | WEIGHT: 141.8 LBS | RESPIRATION RATE: 18 BRPM

## 2024-12-11 DIAGNOSIS — R05.1 ACUTE COUGH: ICD-10-CM

## 2024-12-11 DIAGNOSIS — J22 LRTI (LOWER RESPIRATORY TRACT INFECTION): ICD-10-CM

## 2024-12-11 PROCEDURE — 3078F DIAST BP <80 MM HG: CPT | Performed by: NURSE PRACTITIONER

## 2024-12-11 PROCEDURE — RXMED WILLOW AMBULATORY MEDICATION CHARGE: Performed by: NURSE PRACTITIONER

## 2024-12-11 PROCEDURE — 3074F SYST BP LT 130 MM HG: CPT | Performed by: NURSE PRACTITIONER

## 2024-12-11 PROCEDURE — 99213 OFFICE O/P EST LOW 20 MIN: CPT | Performed by: NURSE PRACTITIONER

## 2024-12-11 RX ORDER — AZITHROMYCIN 250 MG/1
TABLET, FILM COATED ORAL
Qty: 6 TABLET | Refills: 0 | Status: SHIPPED | OUTPATIENT
Start: 2024-12-11

## 2024-12-11 RX ORDER — BENZONATATE 100 MG/1
100 CAPSULE ORAL 3 TIMES DAILY PRN
Qty: 60 CAPSULE | Refills: 0 | Status: SHIPPED | OUTPATIENT
Start: 2024-12-11

## 2024-12-11 ASSESSMENT — ENCOUNTER SYMPTOMS
CHILLS: 0
SPUTUM PRODUCTION: 0
SHORTNESS OF BREATH: 0
WHEEZING: 0
NAUSEA: 0
FEVER: 0
SORE THROAT: 0
HEADACHES: 0
COUGH: 1
RHINORRHEA: 1

## 2024-12-11 ASSESSMENT — FIBROSIS 4 INDEX: FIB4 SCORE: 1.04

## 2024-12-12 NOTE — PROGRESS NOTES
"Subjective:   Charlette Gomez is a 44 y.o. female who presents for Cough (Patient was sick 2 weeks ago, still has a lingering cough.)      URI   This is a new problem. Episode onset: 2 weeks; traveled from [place]. The problem has been unchanged. Associated symptoms include congestion, coughing and rhinorrhea. Pertinent negatives include no ear pain, headaches, nausea, plugged ear sensation, sore throat or wheezing. She has tried acetaminophen and sleep for the symptoms.       Review of Systems   Constitutional:  Positive for malaise/fatigue. Negative for chills and fever.   HENT:  Positive for congestion and rhinorrhea. Negative for ear pain and sore throat.    Respiratory:  Positive for cough. Negative for sputum production, shortness of breath and wheezing.    Gastrointestinal:  Negative for nausea.   Neurological:  Negative for headaches.       Medications:    azithromycin Tabs  benzonatate Caps  buPROPion    Allergies: Patient has no known allergies.    Problem List: Charlette Gomez does not have any pertinent problems on file.    Surgical History:  Past Surgical History:   Procedure Laterality Date    OTHER      ivf procedure       Past Social Hx: Charlette Gomez  reports that she has never smoked. She has never been exposed to tobacco smoke. She has never used smokeless tobacco. She reports current alcohol use of about 1.8 oz of alcohol per week. She reports that she does not use drugs.     Past Family Hx:  Charlette Gomez family history includes Cancer in her paternal aunt and paternal grandmother; Heart Disease in her father; Hypertension in her father; Thyroid in her mother.     Problem list, medications, and allergies reviewed by myself today in Epic.     Objective:     /72   Pulse 84   Temp 36.4 °C (97.6 °F) (Temporal)   Resp 18   Ht 1.6 m (5' 3\")   Wt 64.3 kg (141 lb 12.8 oz)   SpO2 99%   BMI 25.12 kg/m²     Physical Exam  Vitals and nursing note reviewed. "   Constitutional:       General: She is not in acute distress.     Appearance: She is well-developed.   HENT:      Head: Normocephalic and atraumatic.      Right Ear: Tympanic membrane and external ear normal.      Left Ear: Tympanic membrane and external ear normal.      Nose: Nose normal.      Right Sinus: No maxillary sinus tenderness or frontal sinus tenderness.      Left Sinus: No maxillary sinus tenderness or frontal sinus tenderness.      Mouth/Throat:      Mouth: Mucous membranes are moist.      Pharynx: Uvula midline. No posterior oropharyngeal erythema.      Tonsils: No tonsillar exudate or tonsillar abscesses.   Eyes:      General:         Right eye: No discharge.         Left eye: No discharge.      Conjunctiva/sclera: Conjunctivae normal.   Cardiovascular:      Rate and Rhythm: Normal rate.   Pulmonary:      Effort: Pulmonary effort is normal. No respiratory distress.      Breath sounds: Normal breath sounds.   Abdominal:      General: There is no distension.   Musculoskeletal:         General: Normal range of motion.   Skin:     General: Skin is warm and dry.   Neurological:      General: No focal deficit present.      Mental Status: She is alert and oriented to person, place, and time. Mental status is at baseline.      Gait: Gait (gait at baseline) normal.   Psychiatric:         Judgment: Judgment normal.         Assessment/Plan:     Diagnosis and associated orders:     1. LRTI (lower respiratory tract infection)  azithromycin (ZITHROMAX) 250 MG Tab    benzonatate (TESSALON) 100 MG Cap      2. Acute cough  azithromycin (ZITHROMAX) 250 MG Tab    benzonatate (TESSALON) 100 MG Cap         Comments/MDM:     I personally reviewed prior external notes and prior test results pertinent to today's visit.   Discussed management options, risks and benefits, and alternatives to treatment plan agreed upon.   Red flags discussed and indications to immediately call 911 or present to the Emergency Department.    Supportive care, differential diagnoses, and indications for immediate follow-up discussed with patient.    Patient expresses understanding and agrees to plan. Patient denies any other questions or concerns.            Please note that this dictation was created using voice recognition software. I have made a reasonable attempt to correct obvious errors, but I expect that there are errors of grammar and possibly content that I did not discover before finalizing the note.    This note was electronically signed by Hardeep MURRELL.

## 2024-12-27 ENCOUNTER — HOSPITAL ENCOUNTER (OUTPATIENT)
Dept: RADIOLOGY | Facility: MEDICAL CENTER | Age: 44
End: 2024-12-27
Attending: FAMILY MEDICINE
Payer: COMMERCIAL

## 2024-12-27 DIAGNOSIS — Z12.31 ENCOUNTER FOR SCREENING MAMMOGRAM FOR MALIGNANT NEOPLASM OF BREAST: ICD-10-CM

## 2024-12-27 PROCEDURE — 77067 SCR MAMMO BI INCL CAD: CPT | Mod: 50

## 2025-01-16 ENCOUNTER — OFFICE VISIT (OUTPATIENT)
Dept: MEDICAL GROUP | Facility: MEDICAL CENTER | Age: 45
End: 2025-01-16
Payer: COMMERCIAL

## 2025-01-16 VITALS
DIASTOLIC BLOOD PRESSURE: 68 MMHG | OXYGEN SATURATION: 98 % | TEMPERATURE: 98 F | BODY MASS INDEX: 25.34 KG/M2 | SYSTOLIC BLOOD PRESSURE: 106 MMHG | HEIGHT: 63 IN | HEART RATE: 93 BPM | WEIGHT: 143 LBS

## 2025-01-16 DIAGNOSIS — J02.9 ACUTE PHARYNGITIS, UNSPECIFIED ETIOLOGY: ICD-10-CM

## 2025-01-16 LAB
FLUAV RNA SPEC QL NAA+PROBE: NEGATIVE
FLUBV RNA SPEC QL NAA+PROBE: NEGATIVE
RSV RNA SPEC QL NAA+PROBE: NEGATIVE
S PYO DNA SPEC NAA+PROBE: NOT DETECTED
SARS-COV-2 RNA RESP QL NAA+PROBE: NEGATIVE

## 2025-01-16 PROCEDURE — 99213 OFFICE O/P EST LOW 20 MIN: CPT | Performed by: BEHAVIOR ANALYST

## 2025-01-16 PROCEDURE — 3074F SYST BP LT 130 MM HG: CPT | Performed by: BEHAVIOR ANALYST

## 2025-01-16 PROCEDURE — 87651 STREP A DNA AMP PROBE: CPT | Performed by: BEHAVIOR ANALYST

## 2025-01-16 PROCEDURE — 3078F DIAST BP <80 MM HG: CPT | Performed by: BEHAVIOR ANALYST

## 2025-01-16 PROCEDURE — 0241U POCT CEPHEID COV-2, FLU A/B, RSV - PCR: CPT | Performed by: BEHAVIOR ANALYST

## 2025-01-16 ASSESSMENT — FIBROSIS 4 INDEX: FIB4 SCORE: 1.04

## 2025-01-16 NOTE — PATIENT INSTRUCTIONS
Effective treatments for an upper respiratory infection:    - Joseph Med squeeze bottle sinus rinses twice a day then flonase or nasacort or generic for these once daily after rinse.  - Use a humidifier, especially at night. Cold or warm water humidifiers have the same effect.  - Hot tea + honey + fresh lemon juice  - Throat Coat herbal tea  - Honey by itself has been shown to help fight bugs and provide cough relief  - Lots of fluids.     Medications Dose Side effects/precautions Effect   Antihistamine: Claritin/loratadine, Zyrtec/cetirizine      Decongestants: mild-phenylephrine like sudafed PE  More potent:- Pseudoephedrine like claritin D          Coricidin HBP          Guaifenesin/  Mucinex        Dextromethorphan Varies         Pseudoephedrine more likely to cause side effects of anxiety and jitteriness. **Not safe for those with high blood pressure or other heart problems.     Safe for those with high blood pressure and heart problems.              Safe for those with high blood pressure and heart problems. Improves nasal congestion & runny nose      Improves congestions                   Improves cold/flu symptoms        Thin mucus, assists with productivity of cough    Cough suppressant     NSAIDs (ibuprofen, advil, motrin, aleve, etc)      Tylenol/Acetominophen Varies          Not to exceed 2500-4000mg per day from all sources Can raise blood pressure. Not safe for those with kidney disease.     Caution with liver disease. Improves sneezing, headache, ear pain, muscle pain, joint pain   Zinc acetate or gluconate 80-92 mg per day Start within 3 days & continue as long as symptoms persist Can shorten duration of symptoms

## 2025-01-16 NOTE — PROGRESS NOTES
"Chief Complaint   Patient presents with    Sore Throat     Started Monday, feeling very tired.         HPI: This is a 44 y.o. patient has 4 days of sore throat- painful when swallowing.  Mild sinus congestion in the morning.  No ear fullness. No abnormal shortness of breath. Mild nausea vomiting or diarrhea. Mild subjective night sweats or chills. Possible sick exposures- traveling.  Patient has taken over-the-counter analgesics and decongestant with minimal relief.  Feels tired and achy.      ROS:  No fever, cough, changes in bowel movements or skin rash.      I reviewed the patient's medications, allergies and medical history:  Current Outpatient Medications   Medication Sig Dispense Refill    azithromycin (ZITHROMAX) 250 MG Tab Take 2 tablets by mouth on day one. Take one tablet by mouth the remaining days until gone 6 Tablet 0    benzonatate (TESSALON) 100 MG Cap Take 1 Capsule by mouth 3 times a day as needed for Cough. 60 Capsule 0    buPROPion (WELLBUTRIN XL) 300 MG XL tablet Take 300 mg by mouth every morning. (Patient not taking: Reported on 1/16/2025)       No current facility-administered medications for this visit.     Patient has no known allergies.  History reviewed. No pertinent past medical history.     EXAM:  /68 (BP Location: Left arm, Patient Position: Sitting, BP Cuff Size: Adult)   Pulse 93   Temp 36.7 °C (98 °F) (Temporal)   Ht 1.6 m (5' 3\")   Wt 64.9 kg (143 lb)   SpO2 98%   General: NAD, non-toxic appearance.  Eyes: PERRL, conjunctiva slightly injected, no photophobia or eye discharge.  Ears: Normal pinnae. TM's pearly gray with good landmarks bilaterally.  Nares: Patent with thin, clear mucus.  Throat: Erythematous injection without enlarged tonsils. No exudates.   Neck: Supple, with shotty anterior cervical lymphadenopathy.  Lungs: Good air entry bilaterally, clear to auscultation. No wheeze, rhonchi or crackles. Normal respiratory effort.  Heart: Regular rate without " murmur.  Abdomen: Soft and non-tender. No hepatosplenomegaly.  Skin: Warm and dry. No rash.     Component      Latest Ref Rng 1/16/2025   SARS-CoV-2 by PCR      Negative, Invalid  Negative    Influenza virus A RNA      Negative, Invalid  Negative    Influenza virus B, PCR      Negative, Invalid  Negative    RSV, PCR      Negative, Invalid  Negative    POC Group A Strep, PCR      Not Detected, Invalid  Not Detected           ASSESSMENT:   1. Acute pharyngitis, unspecified etiology  POCT GROUP A STREP, PCR    POCT CoV-2, Flu A/B, RSV by PCR        All POCT testing negative    PLAN:  1. Discussed benign nature of viral upper respiratory infections.  2. OTC anti-pyretics as needed. Supportive care advised- vit d and zinc supplement.  3. Follow-up in office or urgent care for worsening symptoms, difficulty breathing, lack of expected recovery, or should new symptoms or problems arise.

## 2025-01-17 ENCOUNTER — APPOINTMENT (OUTPATIENT)
Dept: MEDICAL GROUP | Facility: MEDICAL CENTER | Age: 45
End: 2025-01-17
Payer: COMMERCIAL

## 2025-04-08 ENCOUNTER — OFFICE VISIT (OUTPATIENT)
Dept: MEDICAL GROUP | Facility: MEDICAL CENTER | Age: 45
End: 2025-04-08
Payer: COMMERCIAL

## 2025-04-08 VITALS
WEIGHT: 145.28 LBS | DIASTOLIC BLOOD PRESSURE: 72 MMHG | BODY MASS INDEX: 24.21 KG/M2 | HEART RATE: 117 BPM | SYSTOLIC BLOOD PRESSURE: 108 MMHG | HEIGHT: 65 IN | OXYGEN SATURATION: 100 % | TEMPERATURE: 98.6 F

## 2025-04-08 DIAGNOSIS — Z13.1 SCREENING FOR DIABETES MELLITUS: ICD-10-CM

## 2025-04-08 DIAGNOSIS — S76.912A MUSCLE STRAIN OF LEFT THIGH, INITIAL ENCOUNTER: ICD-10-CM

## 2025-04-08 DIAGNOSIS — R53.83 OTHER FATIGUE: ICD-10-CM

## 2025-04-08 DIAGNOSIS — Z13.220 SCREENING FOR LIPID DISORDERS: ICD-10-CM

## 2025-04-08 PROCEDURE — 99214 OFFICE O/P EST MOD 30 MIN: CPT | Performed by: FAMILY MEDICINE

## 2025-04-08 PROCEDURE — 3074F SYST BP LT 130 MM HG: CPT | Performed by: FAMILY MEDICINE

## 2025-04-08 PROCEDURE — 3078F DIAST BP <80 MM HG: CPT | Performed by: FAMILY MEDICINE

## 2025-04-08 RX ORDER — CYCLOBENZAPRINE HCL 5 MG
5 TABLET ORAL 3 TIMES DAILY PRN
Qty: 30 TABLET | Refills: 1 | Status: SHIPPED | OUTPATIENT
Start: 2025-04-08

## 2025-04-08 ASSESSMENT — PATIENT HEALTH QUESTIONNAIRE - PHQ9
5. POOR APPETITE OR OVEREATING: NOT AT ALL
2. FEELING DOWN, DEPRESSED, IRRITABLE, OR HOPELESS: NOT AT ALL
7. TROUBLE CONCENTRATING ON THINGS, SUCH AS READING THE NEWSPAPER OR WATCHING TELEVISION: NOT AT ALL
3. TROUBLE FALLING OR STAYING ASLEEP OR SLEEPING TOO MUCH: NOT AT ALL
6. FEELING BAD ABOUT YOURSELF - OR THAT YOU ARE A FAILURE OR HAVE LET YOURSELF OR YOUR FAMILY DOWN: NOT AL ALL
4. FEELING TIRED OR HAVING LITTLE ENERGY: SEVERAL DAYS
9. THOUGHTS THAT YOU WOULD BE BETTER OFF DEAD, OR OF HURTING YOURSELF: NOT AT ALL
1. LITTLE INTEREST OR PLEASURE IN DOING THINGS: NOT AT ALL
SUM OF ALL RESPONSES TO PHQ9 QUESTIONS 1 AND 2: 0
SUM OF ALL RESPONSES TO PHQ QUESTIONS 1-9: 1
8. MOVING OR SPEAKING SO SLOWLY THAT OTHER PEOPLE COULD HAVE NOTICED. OR THE OPPOSITE, BEING SO FIGETY OR RESTLESS THAT YOU HAVE BEEN MOVING AROUND A LOT MORE THAN USUAL: NOT AT ALL

## 2025-04-08 ASSESSMENT — ENCOUNTER SYMPTOMS
FEVER: 0
CHILLS: 0

## 2025-04-08 ASSESSMENT — FIBROSIS 4 INDEX: FIB4 SCORE: 1.07

## 2025-04-08 NOTE — PROGRESS NOTES
Verbal consent was acquired by the patient to use [employer] ambient listening note generation during this visit.      Charlette was seen today for follow-up.    Diagnoses and all orders for this visit:    Muscle strain of left thigh, initial encounter  -     Referral to Physical Therapy  -     cyclobenzaprine (FLEXERIL) 5 mg tablet; Take 1 Tablet by mouth 3 times a day as needed for Moderate Pain or Muscle Spasms.    Other fatigue  -     CBC WITH DIFFERENTIAL; Future  -     Comp Metabolic Panel; Future  -     VITAMIN D,25 HYDROXY (DEFICIENCY); Future  -     TSH; Future  -     FREE THYROXINE; Future  -     VITAMIN B12; Future    Screening for diabetes mellitus  -     HEMOGLOBIN A1C; Future    Screening for lipid disorders  -     Lipid Profile; Future                  Assessment & Plan  1.  Muscle strain of the left thigh, hamstring strain  New condition, unstable  - Symptoms suggest a strain in the hamstring muscle rather than sciatica (no numbness, tingling, or direct trauma)  - Pain described as a dull ache that worsens with activity and improves with rest  - Advise abstaining from physical activities and ensuring complete rest  - Referral for physical therapy  - Prescribe muscle relaxant for nighttime use  - Recommend Voltaren gel (diclofenac) for application on the back every 6 hours during the day to alleviate inflammation  - Encourage stretching exercises or yoga  - Important to note: Voltaren gel and ibuprofen should not be used concurrently; maintain a gap of 4 to 6 hours between applications  - If ibuprofen is taken in the morning, delay application of Voltaren gel by 4 to 6 hours  - Ibuprofen should be taken with food to prevent stomach upset; for extended use, prefer the gel due to its localized and topical application    2.  Fatigue symptoms  New condition, stable, check workup to evaluate for fatigue.    Follow-up in 3 months for annual for Pap and lab follow-up.          Chief complaint::Diagnoses of  "Muscle strain of left thigh, initial encounter, Other fatigue, Screening for diabetes mellitus, and Screening for lipid disorders were pertinent to this visit.      History of Present Illness  The patient is a 45-year-old female who presents for evaluation of left hip and leg pain.    Left Hip and Leg Pain  - Recently resumed workout regimen, including weightlifting, without adequate stretching exercises  - Experienced soreness in left hip following a workout session  - Continued routine, including travel for work  - Sought relief through a massage, believes excessive pressure was applied by therapist  - Incident occurred approximately 10 days ago  - Pain escalated to a severity of 5 to 6 on a scale of 10  - Pain described as a dull ache radiating down the posterior aspect of thigh  - Reports no numbness, tingling, or direct trauma to the area  - Difficulty performing tasks such as lifting leg to wear a sock or standing on affected leg  - Managing pain with ibuprofen, taken twice daily.    She has been feeling fatigue symptoms, would like to get evaluated.        Review of Systems   Constitutional:  Negative for chills and fever.   Musculoskeletal:         Hamstring muscle pain of left side          Medications and Allergies:     Current Outpatient Medications   Medication Sig Dispense Refill    multivitamin Tab Take 1 Tablet by mouth every day.      MAGNESIUM GLYCINATE PO Take  by mouth.      Cholecalciferol (VITAMIN D-3 PO) Take  by mouth.      cyclobenzaprine (FLEXERIL) 5 mg tablet Take 1 Tablet by mouth 3 times a day as needed for Moderate Pain or Muscle Spasms. 30 Tablet 1    buPROPion (WELLBUTRIN XL) 300 MG XL tablet Take 300 mg by mouth every morning.       No current facility-administered medications for this visit.       /72 (BP Location: Left arm, Patient Position: Sitting, BP Cuff Size: Adult)   Pulse (!) 117   Temp 37 °C (98.6 °F) (Temporal)   Ht 1.645 m (5' 4.75\")   Wt 65.9 kg (145 lb 4.5 oz)  "  SpO2 100% , Body mass index is 24.36 kg/m².      Physical Exam  Constitutional:       Appearance: Normal appearance. She is well-developed and well-groomed.   HENT:      Head: Normocephalic and atraumatic.      Right Ear: External ear normal.      Left Ear: External ear normal.   Eyes:      General:         Right eye: No discharge.         Left eye: No discharge.      Conjunctiva/sclera: Conjunctivae normal.   Cardiovascular:      Rate and Rhythm: Normal rate.   Pulmonary:      Effort: Pulmonary effort is normal. No respiratory distress.   Musculoskeletal:      Cervical back: Neck supple.      Comments: Pain at the left hamstring on flexion at knee and hip.   Skin:     Findings: No rash.   Neurological:      Mental Status: She is alert.   Psychiatric:         Mood and Affect: Mood and affect normal.         Behavior: Behavior normal.                  Please note that this dictation was created using voice recognition software. I have made every reasonable attempt to correct obvious errors, but I expect that there are errors of grammar and possibly content that I did not discover before finalizing the note.

## 2025-04-10 ENCOUNTER — OFFICE VISIT (OUTPATIENT)
Dept: URGENT CARE | Facility: CLINIC | Age: 45
End: 2025-04-10
Payer: COMMERCIAL

## 2025-04-10 ENCOUNTER — APPOINTMENT (OUTPATIENT)
Dept: URGENT CARE | Facility: CLINIC | Age: 45
End: 2025-04-10
Payer: COMMERCIAL

## 2025-04-10 VITALS
BODY MASS INDEX: 24.75 KG/M2 | HEART RATE: 100 BPM | DIASTOLIC BLOOD PRESSURE: 68 MMHG | HEIGHT: 64 IN | WEIGHT: 145 LBS | SYSTOLIC BLOOD PRESSURE: 110 MMHG | OXYGEN SATURATION: 96 % | TEMPERATURE: 97.8 F | RESPIRATION RATE: 16 BRPM

## 2025-04-10 DIAGNOSIS — M76.61 ACHILLES TENDINITIS OF RIGHT LOWER EXTREMITY: ICD-10-CM

## 2025-04-10 PROCEDURE — 99213 OFFICE O/P EST LOW 20 MIN: CPT | Performed by: PHYSICIAN ASSISTANT

## 2025-04-10 RX ORDER — ACETAMINOPHEN 500 MG
1000 TABLET ORAL ONCE
Status: COMPLETED | OUTPATIENT
Start: 2025-04-10 | End: 2025-04-10

## 2025-04-10 RX ADMIN — Medication 1000 MG: at 11:14

## 2025-04-10 ASSESSMENT — FIBROSIS 4 INDEX: FIB4 SCORE: 1.07

## 2025-04-10 NOTE — PROGRESS NOTES
Subjective:   Charlette Gomez is a 45 y.o. female who presents today with   Chief Complaint   Patient presents with    Foot Problem     X 2 weeks/ R foot / difficulty walking/ painful/ pt states R hand is swollen too        Foot Problem  This is a new problem. The current episode started 1 to 4 weeks ago. The problem occurs constantly. The problem has been unchanged. She has tried nothing for the symptoms. The treatment provided no relief.     Patient states she recently started working out and exercising more frequently and having some strain to her left hamstring but was treating that with over-the-counter Voltaren and oral ibuprofen and that seemed to be better.  She then started noticing some pain through the right back of the foot.  No apparent injury.    PMH:  has no past medical history on file.  MEDS:   Current Outpatient Medications:     multivitamin Tab, Take 1 Tablet by mouth every day., Disp: , Rfl:     MAGNESIUM GLYCINATE PO, Take  by mouth., Disp: , Rfl:     Cholecalciferol (VITAMIN D-3 PO), Take  by mouth., Disp: , Rfl:     cyclobenzaprine (FLEXERIL) 5 mg tablet, Take 1 Tablet by mouth 3 times a day as needed for Moderate Pain or Muscle Spasms., Disp: 30 Tablet, Rfl: 1    buPROPion (WELLBUTRIN XL) 300 MG XL tablet, Take 300 mg by mouth every morning., Disp: , Rfl:     Current Facility-Administered Medications:     acetaminophen (Tylenol) tablet 1,000 mg, 1,000 mg, Oral, Once,   ALLERGIES: No Known Allergies  SURGHX:   Past Surgical History:   Procedure Laterality Date    OTHER      ivf procedure     SOCHX:  reports that she has never smoked. She has never been exposed to tobacco smoke. She has never used smokeless tobacco. She reports current alcohol use of about 1.8 oz of alcohol per week. She reports that she does not use drugs.  FH: Reviewed with patient, not pertinent to this visit.       Review of Systems   Musculoskeletal:         Right achilles tenderness        Objective:   BP  "110/68   Pulse 100   Temp 36.6 °C (97.8 °F) (Temporal)   Resp 16   Ht 1.626 m (5' 4\")   Wt 65.8 kg (145 lb)   SpO2 96%   BMI 24.89 kg/m²   Physical Exam  Vitals and nursing note reviewed.   Constitutional:       General: She is not in acute distress.     Appearance: Normal appearance. She is well-developed. She is not ill-appearing or toxic-appearing.   HENT:      Head: Normocephalic and atraumatic.      Right Ear: Hearing normal.      Left Ear: Hearing normal.   Cardiovascular:      Rate and Rhythm: Normal rate.   Pulmonary:      Effort: Pulmonary effort is normal.   Musculoskeletal:      Left ankle:      Left Achilles Tendon: Tenderness present. No defects. Martinez's test negative.        Legs:       Comments: Normal movement in all 4 extremities.  No bony tenderness to palpation.  Mild swelling.  No posterior swelling or erythema.  Patient able to plantarflex and dorsiflex without significant discomfort.  Neurovascular intact distally.  Less than 2 cap refill.   Skin:     General: Skin is warm and dry.   Neurological:      Mental Status: She is alert.      Coordination: Coordination normal.   Psychiatric:         Mood and Affect: Mood normal.       Assessment/Plan:   Assessment    1. Achilles tendinitis of right lower extremity  - acetaminophen (Tylenol) tablet 1,000 mg  - Referral to Sports Medicine    Symptoms and presentation appear consistent with Achilles tendinitis.  No signs of DVT.  Offered walking boot today but patient declines.  Recommend rest but also some stretching.  Would recommend following up with sports medicine.  Recommend rest as well as over-the-counter ibuprofen or Tylenol per manufactures instructions.  No indication for x-ray today.    Differential diagnosis, natural history, supportive care, and indications for immediate follow-up discussed.   Patient given instructions and understanding of medications and treatment.    If not improving in 3-5 days, F/U with PCP or return to  if " symptoms worsen.    Patient agreeable to plan.        Please note that this dictation was created using voice recognition software. I have made every reasonable attempt to correct obvious errors, but I expect that there are errors of grammar and possibly content that I did not discover before finalizing the note.    Lul Beavers PA-C

## 2025-04-11 NOTE — Clinical Note
REFERRAL APPROVAL NOTICE         Sent on April 11, 2025                   Charlette Gomez  [address]  [facility] [place] [place] [address]                   Dear Ms. Gomez,    After a careful review of the medical information and benefit coverage, [facility] has processed your referral. See below for additional details.    If applicable, you must be actively enrolled with your insurance for coverage of the authorized service. If you have any questions regarding your coverage, please contact your insurance directly.    REFERRAL INFORMATION   Referral #:  [id number]  Referred-To Department    Referred-By Provider:  Sports Medicine    Lul Beavers P.A.-C.   [facility] [address]      [address]  [address]  [facility] [place] [id number] [phone] [address] [facility] [place] [id number] [phone]    Referral Start Date:  04/10/2025  Referral End Date:   04/10/2026             SCHEDULING  If you do not already have an appointment, please call [phone] to make an appointment.     MORE INFORMATION  If you do not already have a [employer] account, sign up at: [email or web address]  You can access your medical information, make appointments, see lab results, billing information, and more.  If you have questions regarding this referral, please contact  the [facility] Referrals department at:             [phone]. Monday - Friday 8:00AM - 5:00PM.     Sincerely,    [facility]

## 2025-04-11 NOTE — Clinical Note
REFERRAL APPROVAL NOTICE         Sent on April 11, 2025                   Charlette Gomez  [address]  [place] [facility] [place] [address]                   Dear Ms. Gomez,    After a careful review of the medical information and benefit coverage, [facility] has processed your referral. See below for additional details.    If applicable, you must be actively enrolled with your insurance for coverage of the authorized service. If you have any questions regarding your coverage, please contact your insurance directly.    REFERRAL INFORMATION   Referral #:  [id number]  Referred-To Department    Referred-By Provider:  Physical Therapy    Jared Barrios M.D.   [facility] [address]      [address]  [address]  [place] [id number] [phone] [address]  [address]  [place] [id number] [phone]    Referral Start Date:  04/08/2025  Referral End Date:   04/08/2026             SCHEDULING  If you do not already have an appointment, please call [phone] to make an appointment.     MORE INFORMATION  If you do not already have a [employer] account, sign up at: [email or web address]  You can access your medical information, make appointments, see lab results, billing information, and more.  If you have questions regarding this referral, please contact  the [facility] Referrals department at:             [phone]. Monday - Friday 8:00AM - 5:00PM.     Sincerely,    [facility]

## 2025-04-14 ENCOUNTER — PATIENT MESSAGE (OUTPATIENT)
Dept: MEDICAL GROUP | Facility: MEDICAL CENTER | Age: 45
End: 2025-04-14
Payer: COMMERCIAL

## 2025-04-17 ENCOUNTER — OFFICE VISIT (OUTPATIENT)
Dept: MEDICAL GROUP | Facility: MEDICAL CENTER | Age: 45
End: 2025-04-17
Payer: COMMERCIAL

## 2025-04-17 VITALS
HEIGHT: 64 IN | DIASTOLIC BLOOD PRESSURE: 64 MMHG | TEMPERATURE: 97.9 F | WEIGHT: 141.76 LBS | SYSTOLIC BLOOD PRESSURE: 106 MMHG | HEART RATE: 109 BPM | BODY MASS INDEX: 24.2 KG/M2 | OXYGEN SATURATION: 100 %

## 2025-04-17 DIAGNOSIS — M25.50 POLYARTHRALGIA: ICD-10-CM

## 2025-04-17 DIAGNOSIS — Z82.61 FAMILY HISTORY OF RHEUMATOID ARTHRITIS: ICD-10-CM

## 2025-04-17 DIAGNOSIS — M76.61 ACHILLES TENDINITIS OF RIGHT LOWER EXTREMITY: ICD-10-CM

## 2025-04-17 PROCEDURE — 3074F SYST BP LT 130 MM HG: CPT | Performed by: FAMILY MEDICINE

## 2025-04-17 PROCEDURE — 3078F DIAST BP <80 MM HG: CPT | Performed by: FAMILY MEDICINE

## 2025-04-17 PROCEDURE — 99214 OFFICE O/P EST MOD 30 MIN: CPT | Performed by: FAMILY MEDICINE

## 2025-04-17 ASSESSMENT — ENCOUNTER SYMPTOMS
FEVER: 0
CHILLS: 0

## 2025-04-17 ASSESSMENT — FIBROSIS 4 INDEX: FIB4 SCORE: 1.07

## 2025-04-17 NOTE — PROGRESS NOTES
Verbal consent was acquired by the patient to use [employer] ambient listening note generation during this visit.      Charlette was seen today for follow-up.    Diagnoses and all orders for this visit:    Polyarthralgia  -     RHEUMATOID ARTHRITIS FACTOR; Future  -     CBC WITH DIFFERENTIAL; Future  -     Cancel: Comp Metabolic Panel; Future  -     Sed Rate; Future  -     CRP QUANTITIVE (NON-CARDIAC); Future  -     URIC ACID; Future  -     ARETHA IGG JOSSUE W/RFLX TO ARETHA IGG IFA; Future  -     CCP  -     DX-HAND 2- RIGHT; Future  -     DX-HAND 2- LEFT; Future  -     Comp Metabolic Panel; Future    Family history of rheumatoid arthritis    Achilles tendinitis of right lower extremity                  Assessment & Plan  1. Hand joint pain and swelling/polyarthralgia  New condition, unstable  - Symptoms: stiffness, pain throughout the day, swelling in both hands (no redness or wrist pain)  - Full hand x-ray for both hands  - Blood tests: blood counts, sed rate, CRP, uric acid, ARETHA titer, CCP antibody  - Discussion: potential for rheumatoid arthritis, family history of RA and osteoarthritis, possibility of other autoimmune conditions  - Counseling on implications of positive ARETHA titers  - Ibuprofen recommended as needed (caution to avoid excessive use)  - Referral to rheumatologist if tests indicate rheumatoid arthritis  - Further autoimmune testing if ARETHA titer is positive    2. Achilles tendon pain  New condition, unstable  - Significant pain in Achilles tendon, improvement with daily Tylenol  - Ongoing physical therapy for Achilles tendon (referral already in place)  - Inform office if new referral is needed  - Discussion: use of Tylenol and ibuprofen (caution regarding potential liver and kidney effects)  - Continuation of physical therapy  - Mindful use of Tylenol and ibuprofen recommended    Follow-up in July for annual physical      Chief complaint::Diagnoses of Polyarthralgia, Family history of rheumatoid arthritis,  and Achilles tendinitis of right lower extremity were pertinent to this visit.      History of Present Illness  The patient is a 45-year-old female who presents for hand joint pain and swelling.    Hand Joint Pain and Swelling  - She reports experiencing stiffness in her hands upon awakening, which persists as pain throughout the day.  - The onset of these symptoms was approximately 2.5 weeks ago, initially presenting in one hand before manifesting in the other.  - She describes the pain as diffuse across both hands rather than localized to a specific area.  - Swelling is noted, but no associated redness is reported.  - Activities such as petting her dog or scratching her face exacerbate the pain.  - No wrist pain, numbness, or tingling sensations are reported.  - Pain management has been achieved with ibuprofen, which she finds beneficial.    Achilles Tendon Discomfort  - She reports improvement in her hamstring condition; however, significant discomfort is now experienced in her Achilles tendon.  - Daily Tylenol intake has been helpful for both her Achilles tendon and hand pain.  - Tylenol dosage includes 1000 mg in the morning and 500 mg at night.  - A physical therapy appointment is scheduled for tomorrow morning.    Supplemental information: Family history includes rheumatoid arthritis in her father's sister and osteoarthritis in her father and his older sister.    FAMILY HISTORY  Her paternal aunt has rheumatoid arthritis. Her father and his older sister have osteoarthritis.      Review of Systems   Constitutional:  Negative for chills and fever.   Musculoskeletal:  Positive for joint pain.          Medications and Allergies:     Current Outpatient Medications   Medication Sig Dispense Refill    multivitamin Tab Take 1 Tablet by mouth every day.      MAGNESIUM GLYCINATE PO Take  by mouth.      Cholecalciferol (VITAMIN D-3 PO) Take  by mouth.      cyclobenzaprine (FLEXERIL) 5 mg tablet Take 1 Tablet by mouth 3  "times a day as needed for Moderate Pain or Muscle Spasms. 30 Tablet 1    buPROPion (WELLBUTRIN XL) 300 MG XL tablet Take 300 mg by mouth every morning.       No current facility-administered medications for this visit.       /64 (BP Location: Left arm, Patient Position: Sitting, BP Cuff Size: Adult)   Pulse (!) 109   Temp 36.6 °C (97.9 °F) (Temporal)   Ht 1.619 m (5' 3.75\")   Wt 64.3 kg (141 lb 12.1 oz)   SpO2 100% , Body mass index is 24.52 kg/m².      Physical Exam  Constitutional:       Appearance: Normal appearance. She is well-developed and well-groomed.   HENT:      Head: Normocephalic and atraumatic.      Right Ear: External ear normal.      Left Ear: External ear normal.   Eyes:      General:         Right eye: No discharge.         Left eye: No discharge.      Conjunctiva/sclera: Conjunctivae normal.   Cardiovascular:      Rate and Rhythm: Normal rate.   Pulmonary:      Effort: Pulmonary effort is normal. No respiratory distress.   Musculoskeletal:      Cervical back: Neck supple.      Comments: Swelling at both hands, more on left than right.  Swelling is present at palmar aspect of hand, no swelling at the joints   Skin:     Findings: No rash.   Neurological:      Mental Status: She is alert.   Psychiatric:         Mood and Affect: Mood and affect normal.         Behavior: Behavior normal.                  Please note that this dictation was created using voice recognition software. I have made every reasonable attempt to correct obvious errors, but I expect that there are errors of grammar and possibly content that I did not discover before finalizing the note.      "

## 2025-04-18 ENCOUNTER — HOSPITAL ENCOUNTER (OUTPATIENT)
Dept: LAB | Facility: MEDICAL CENTER | Age: 45
End: 2025-04-18
Attending: FAMILY MEDICINE
Payer: COMMERCIAL

## 2025-04-18 ENCOUNTER — ANCILLARY PROCEDURE (OUTPATIENT)
Dept: SPORTS MEDICINE | Facility: OTHER | Age: 45
End: 2025-04-18
Attending: FAMILY MEDICINE
Payer: COMMERCIAL

## 2025-04-18 ENCOUNTER — HOSPITAL ENCOUNTER (OUTPATIENT)
Dept: RADIOLOGY | Facility: MEDICAL CENTER | Age: 45
End: 2025-04-18
Attending: FAMILY MEDICINE
Payer: COMMERCIAL

## 2025-04-18 ENCOUNTER — OFFICE VISIT (OUTPATIENT)
Dept: SPORTS MEDICINE | Facility: OTHER | Age: 45
End: 2025-04-18
Attending: PHYSICIAN ASSISTANT
Payer: COMMERCIAL

## 2025-04-18 VITALS
HEART RATE: 112 BPM | DIASTOLIC BLOOD PRESSURE: 54 MMHG | OXYGEN SATURATION: 100 % | BODY MASS INDEX: 24.75 KG/M2 | TEMPERATURE: 98.6 F | WEIGHT: 145 LBS | SYSTOLIC BLOOD PRESSURE: 84 MMHG | HEIGHT: 64 IN

## 2025-04-18 DIAGNOSIS — M25.50 POLYARTHRALGIA: ICD-10-CM

## 2025-04-18 DIAGNOSIS — S76.312A STRAIN OF LEFT HAMSTRING MUSCLE, INITIAL ENCOUNTER: ICD-10-CM

## 2025-04-18 DIAGNOSIS — S80.11XA HEMATOMA OF RIGHT LOWER LEG: ICD-10-CM

## 2025-04-18 LAB
BASOPHILS # BLD AUTO: 0.3 % (ref 0–1.8)
BASOPHILS # BLD: 0.03 K/UL (ref 0–0.12)
EOSINOPHIL # BLD AUTO: 0.01 K/UL (ref 0–0.51)
EOSINOPHIL NFR BLD: 0.1 % (ref 0–6.9)
ERYTHROCYTE [DISTWIDTH] IN BLOOD BY AUTOMATED COUNT: 43.9 FL (ref 35.9–50)
ERYTHROCYTE [SEDIMENTATION RATE] IN BLOOD BY WESTERGREN METHOD: 29 MM/HOUR (ref 0–25)
HCT VFR BLD AUTO: 38 % (ref 37–47)
HGB BLD-MCNC: 11.8 G/DL (ref 12–16)
IMM GRANULOCYTES # BLD AUTO: 0.03 K/UL (ref 0–0.11)
IMM GRANULOCYTES NFR BLD AUTO: 0.3 % (ref 0–0.9)
LYMPHOCYTES # BLD AUTO: 2.23 K/UL (ref 1–4.8)
LYMPHOCYTES NFR BLD: 19.4 % (ref 22–41)
MCH RBC QN AUTO: 29 PG (ref 27–33)
MCHC RBC AUTO-ENTMCNC: 31.1 G/DL (ref 32.2–35.5)
MCV RBC AUTO: 93.4 FL (ref 81.4–97.8)
MONOCYTES # BLD AUTO: 0.72 K/UL (ref 0–0.85)
MONOCYTES NFR BLD AUTO: 6.3 % (ref 0–13.4)
NEUTROPHILS # BLD AUTO: 8.48 K/UL (ref 1.82–7.42)
NEUTROPHILS NFR BLD: 73.6 % (ref 44–72)
NRBC # BLD AUTO: 0 K/UL
NRBC BLD-RTO: 0 /100 WBC (ref 0–0.2)
PLATELET # BLD AUTO: 435 K/UL (ref 164–446)
PMV BLD AUTO: 9.8 FL (ref 9–12.9)
RBC # BLD AUTO: 4.07 M/UL (ref 4.2–5.4)
WBC # BLD AUTO: 11.5 K/UL (ref 4.8–10.8)

## 2025-04-18 PROCEDURE — 86140 C-REACTIVE PROTEIN: CPT

## 2025-04-18 PROCEDURE — 85652 RBC SED RATE AUTOMATED: CPT

## 2025-04-18 PROCEDURE — 80053 COMPREHEN METABOLIC PANEL: CPT

## 2025-04-18 PROCEDURE — 86431 RHEUMATOID FACTOR QUANT: CPT

## 2025-04-18 PROCEDURE — 85025 COMPLETE CBC W/AUTO DIFF WBC: CPT

## 2025-04-18 PROCEDURE — 73120 X-RAY EXAM OF HAND: CPT | Mod: LT

## 2025-04-18 PROCEDURE — 76882 US LMTD JT/FCL EVL NVASC XTR: CPT | Performed by: FAMILY MEDICINE

## 2025-04-18 PROCEDURE — 36415 COLL VENOUS BLD VENIPUNCTURE: CPT

## 2025-04-18 PROCEDURE — 86200 CCP ANTIBODY: CPT

## 2025-04-18 PROCEDURE — 84550 ASSAY OF BLOOD/URIC ACID: CPT

## 2025-04-18 PROCEDURE — 86038 ANTINUCLEAR ANTIBODIES: CPT

## 2025-04-18 PROCEDURE — 99213 OFFICE O/P EST LOW 20 MIN: CPT | Performed by: FAMILY MEDICINE

## 2025-04-18 PROCEDURE — 3074F SYST BP LT 130 MM HG: CPT | Performed by: FAMILY MEDICINE

## 2025-04-18 PROCEDURE — 3078F DIAST BP <80 MM HG: CPT | Performed by: FAMILY MEDICINE

## 2025-04-18 ASSESSMENT — FIBROSIS 4 INDEX: FIB4 SCORE: 1.07

## 2025-04-18 ASSESSMENT — ENCOUNTER SYMPTOMS
SHORTNESS OF BREATH: 0
DIZZINESS: 0
VOMITING: 0
CHILLS: 0
FEVER: 0
NAUSEA: 0

## 2025-04-18 NOTE — PROGRESS NOTES
Chief Complaint   Patient presents with    Foot Problem     Right achilles brusing     Subjective     Referred by Lul Beavers P.A.-C.   for evaluation of RIGHT Achilles, posterior ankle pain  Date of injury roughly March 20, 2025  Mechanism, bent forward packing and struck her RIGHT Achilles tendon against a chair or some sort of edge  She had some discomfort, bruising dull pain and symptoms got worse and has been continually worsening over the past 2 weeks  Pain is mostly sharp and aching  Worse with curling her toes and ambulation/walking  She also has worsening of her pain with dorsiflexion of the RIGHT ankle  She has tried ice and stretching with limited improvement in symptoms  Ibuprofen and Tylenol helps some  Pain is 6-7 out of 10 and can be as severe as 8 out of 10  No radiation, no radicular symptoms  She denies any prior injuries or issues with the RIGHT foot or ankle    Sales, remote work, mostly desk work and some traveling  Activities include attending the gym and strength training    Review of Systems   Constitutional:  Negative for chills and fever.   Respiratory:  Negative for shortness of breath.    Cardiovascular:  Negative for chest pain.   Gastrointestinal:  Negative for nausea and vomiting.   Neurological:  Negative for dizziness.     PMH:  has no past medical history on file.  MEDS:   Current Outpatient Medications:     multivitamin Tab, Take 1 Tablet by mouth every day., Disp: , Rfl:     MAGNESIUM GLYCINATE PO, Take  by mouth., Disp: , Rfl:     Cholecalciferol (VITAMIN D-3 PO), Take  by mouth., Disp: , Rfl:     cyclobenzaprine (FLEXERIL) 5 mg tablet, Take 1 Tablet by mouth 3 times a day as needed for Moderate Pain or Muscle Spasms., Disp: 30 Tablet, Rfl: 1    buPROPion (WELLBUTRIN XL) 300 MG XL tablet, Take 300 mg by mouth every morning., Disp: , Rfl:   ALLERGIES: No Known Allergies  SURGHX:   Past Surgical History:   Procedure Laterality Date    OTHER      ivf procedure     SOCHX:  reports  "that she has never smoked. She has never been exposed to tobacco smoke. She has never used smokeless tobacco. She reports current alcohol use of about 1.8 oz of alcohol per week. She reports that she does not use drugs.  FH: Family history was reviewed, no pertinent findings to report    Objective   BP (!) 84/54 (BP Location: Right arm, Patient Position: Sitting)   Pulse (!) 112   Temp 37 °C (98.6 °F)   Ht 1.626 m (5' 4\")   Wt 65.8 kg (145 lb)   SpO2 100%   BMI 24.89 kg/m²     RIGHT ANKLE:  There is NO swelling noted at the ankle  Range of motion intact with dorsiflexion and plantarflexion, inversion and eversion  There is NO tenderness of the ATFL, CF or PTF ligament  There is NO tenderness of the lateral malleolus or medial malleolus  Anterior drawer testing is NEGATIVE  Talar tilt testing is NEGATIVE  The foot and ankle is otherwise neurovascularly intact  Unable to toe off    RIGHT FOOT:  There is NO swelling noted at the foot  There is NO tenderness at the base of the fifth metatarsal, cuboid, or tarsal navicular  There is NO pain with metatarsal squeeze test    LEFT ANKLE:  There is NO swelling noted at the ankle  Range of motion intact with dorsiflexion and plantarflexion, inversion and eversion  There is NO tenderness of the ATFL, CF or PTF ligament  There is NO tenderness of the lateral malleolus or medial malleolus  Anterior drawer testing is NEGATIVE  Talar tilt testing is NEGATIVE  The foot and ankle is otherwise neurovascularly intact    LEFT FOOT:  There is NO swelling noted at the foot  There is NO tenderness at the base of the fifth metatarsal, cuboid, or tarsal navicular  There is NO pain with metatarsal squeeze test    NEUTRAL stance  Able to ambulate with ANTALGIC gait with STIFF RIGHT ankle gait    1. Hematoma of right lower leg (distal calf/proximal achilles region)  US-POCT US MUSCULOSKELETAL LIMITED JOINTT      2. Strain of left hamstring muscle, initial encounter          Date of injury " roughly March 20, 2025  Mechanism, bent forward packing and struck her RIGHT Achilles tendon against a chair or some sort of edge    RIGHT calf region, suspect hematoma which is resolving  LEFT hamstring, suspect strain from working out without fatmata tear    Provided with ongoing program for stretching strengthening    Return in about 2 weeks (around 5/2/2025).  To see how she is doing at that point                    Chad Blanco M.D.  [phone]     4/18/2025 Routine     Narrative  RIGHT lower leg Achilles and distal calf region Limited study  Indication RIGHT calf region pain  Short and long axis views obtained  There is some hypoechoic signal in the subcutaneous fat region in the  areas of patient's tenderness  Gastrocnemius and Achilles tendon appear intact  Impression:  Evolving hematoma of the right RIGHT lower leg        Exam Ended: 04/18/25 10:56 AM Last Resulted: 04/18/25 12:24 PM     Thank you Lul Beavers P.A.-C.  For allowing me to participate in caring for your patient.

## 2025-04-19 LAB
ALBUMIN SERPL BCP-MCNC: 4 G/DL (ref 3.2–4.9)
ALBUMIN/GLOB SERPL: 0.9 G/DL
ALP SERPL-CCNC: 77 U/L (ref 30–99)
ALT SERPL-CCNC: 23 U/L (ref 2–50)
ANION GAP SERPL CALC-SCNC: 12 MMOL/L (ref 7–16)
AST SERPL-CCNC: 27 U/L (ref 12–45)
BILIRUB SERPL-MCNC: 0.4 MG/DL (ref 0.1–1.5)
BUN SERPL-MCNC: 12 MG/DL (ref 8–22)
CALCIUM ALBUM COR SERPL-MCNC: 9.6 MG/DL (ref 8.5–10.5)
CALCIUM SERPL-MCNC: 9.6 MG/DL (ref 8.5–10.5)
CHLORIDE SERPL-SCNC: 98 MMOL/L (ref 96–112)
CO2 SERPL-SCNC: 25 MMOL/L (ref 20–33)
CREAT SERPL-MCNC: 0.71 MG/DL (ref 0.5–1.4)
CRP SERPL HS-MCNC: 6.1 MG/DL (ref 0–0.75)
GFR SERPLBLD CREATININE-BSD FMLA CKD-EPI: 108 ML/MIN/1.73 M 2
GLOBULIN SER CALC-MCNC: 4.6 G/DL (ref 1.9–3.5)
GLUCOSE SERPL-MCNC: 125 MG/DL (ref 65–99)
POTASSIUM SERPL-SCNC: 4 MMOL/L (ref 3.6–5.5)
PROT SERPL-MCNC: 8.6 G/DL (ref 6–8.2)
RHEUMATOID FACT SER IA-ACNC: <10 IU/ML (ref 0–14)
SODIUM SERPL-SCNC: 135 MMOL/L (ref 135–145)
URATE SERPL-MCNC: 4.9 MG/DL (ref 1.9–8.2)

## 2025-04-20 LAB
CCP IGA+IGG SERPL IA-ACNC: 5 UNITS (ref 0–19)
NUCLEAR IGG SER QL IA: NORMAL

## 2025-04-21 ENCOUNTER — RESULTS FOLLOW-UP (OUTPATIENT)
Dept: MEDICAL GROUP | Facility: MEDICAL CENTER | Age: 45
End: 2025-04-21

## 2025-04-21 RX ORDER — METHYLPREDNISOLONE 4 MG/1
TABLET ORAL
Qty: 21 TABLET | Refills: 0 | Status: SHIPPED | OUTPATIENT
Start: 2025-04-21

## 2025-05-07 ENCOUNTER — PATIENT MESSAGE (OUTPATIENT)
Dept: MEDICAL GROUP | Facility: MEDICAL CENTER | Age: 45
End: 2025-05-07
Payer: COMMERCIAL

## 2025-05-07 DIAGNOSIS — M25.50 POLYARTHRALGIA: ICD-10-CM

## 2025-05-07 DIAGNOSIS — M79.89 HAND SWELLING: ICD-10-CM

## 2025-05-07 DIAGNOSIS — R79.82 ELEVATED C-REACTIVE PROTEIN (CRP): ICD-10-CM

## 2025-05-07 DIAGNOSIS — R70.0 ESR RAISED: ICD-10-CM

## 2025-05-08 RX ORDER — METHYLPREDNISOLONE 4 MG/1
TABLET ORAL
Qty: 21 TABLET | Refills: 0 | Status: SHIPPED | OUTPATIENT
Start: 2025-05-08

## 2025-05-09 ENCOUNTER — PATIENT MESSAGE (OUTPATIENT)
Dept: MEDICAL GROUP | Facility: MEDICAL CENTER | Age: 45
End: 2025-05-09
Payer: COMMERCIAL

## 2025-05-09 DIAGNOSIS — M25.50 POLYARTHRALGIA: Primary | ICD-10-CM

## 2025-05-09 DIAGNOSIS — R70.0 ESR RAISED: ICD-10-CM

## 2025-05-09 DIAGNOSIS — R79.82 ELEVATED C-REACTIVE PROTEIN (CRP): ICD-10-CM

## 2025-05-10 ENCOUNTER — OFFICE VISIT (OUTPATIENT)
Dept: URGENT CARE | Facility: CLINIC | Age: 45
End: 2025-05-10
Payer: COMMERCIAL

## 2025-05-10 VITALS
WEIGHT: 143.1 LBS | TEMPERATURE: 98.4 F | RESPIRATION RATE: 16 BRPM | OXYGEN SATURATION: 99 % | SYSTOLIC BLOOD PRESSURE: 110 MMHG | DIASTOLIC BLOOD PRESSURE: 72 MMHG | BODY MASS INDEX: 24.43 KG/M2 | HEART RATE: 85 BPM | HEIGHT: 64 IN

## 2025-05-10 DIAGNOSIS — M79.89 ARM SWELLING: ICD-10-CM

## 2025-05-10 DIAGNOSIS — M79.631 PAIN OF RIGHT FOREARM: ICD-10-CM

## 2025-05-10 PROCEDURE — 3074F SYST BP LT 130 MM HG: CPT | Performed by: NURSE PRACTITIONER

## 2025-05-10 PROCEDURE — 3078F DIAST BP <80 MM HG: CPT | Performed by: NURSE PRACTITIONER

## 2025-05-10 PROCEDURE — 99214 OFFICE O/P EST MOD 30 MIN: CPT | Performed by: NURSE PRACTITIONER

## 2025-05-10 ASSESSMENT — FIBROSIS 4 INDEX: FIB4 SCORE: 0.56

## 2025-05-10 NOTE — PROGRESS NOTES
Charlette Gomez is a 45 y.o. female who presents for Arm Pain (Rt arm x 2 days, shoulder pain x 3 days)      HPI  This is a new problem. Charlette Gomez is a 45 y.o. patient who presents to urgent care with c/o: Right arm swelling and pain for 3 days and this morning her right shoulder is hurting up behind the shoulder in her back. She believes her shoulder is hurting because she is compensating for her right forearm.  No injury or unusual activity.  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   She had been having pain in both of her hands recently and was seen by PCP for this. She did a 6 day course of steroids which she stopped a week ago. Pain in her hands resolved but now she has swelling in her right forearm. Neg hx of VTE. Neg family hx VTE. No recent venipuncture of her right arm   Tx tried: voltaren gel.   She has an appointment with sports medicine on Monday for an Achilles ankle pain.  She has been seen by her PCP for polyarthralgia with a negative rheumatology workup.    ROS See HPI    Allergies:     No Known Allergies    PMSFS Hx:  History reviewed. No pertinent past medical history.  Past Surgical History:   Procedure Laterality Date    OTHER      ivf procedure     Family History   Problem Relation Age of Onset    Thyroid Mother     Heart Disease Father     Hypertension Father     Cancer Paternal Grandmother         NECK CANCER    Cancer Paternal Aunt     Breast Cancer Paternal Aunt      Social History     Tobacco Use    Smoking status: Never     Passive exposure: Never    Smokeless tobacco: Never   Substance Use Topics    Alcohol use: Yes     Alcohol/week: 1.8 oz     Types: 3 Glasses of wine per week     Comment: social         Problems:   Patient Active Problem List   Diagnosis    Current mild episode of major depressive disorder without prior episode (HCC)    Sternal pain    Other fatigue    Skin lesion of neck    Jaw pain    Vitamin D insufficiency    Polyarthralgia       Medications:  "  Current Outpatient Medications on File Prior to Visit   Medication Sig Dispense Refill    multivitamin Tab Take 1 Tablet by mouth every day.      MAGNESIUM GLYCINATE PO Take  by mouth.      Cholecalciferol (VITAMIN D-3 PO) Take  by mouth.      cyclobenzaprine (FLEXERIL) 5 mg tablet Take 1 Tablet by mouth 3 times a day as needed for Moderate Pain or Muscle Spasms. 30 Tablet 1    buPROPion (WELLBUTRIN XL) 300 MG XL tablet Take 300 mg by mouth every morning.      methylPREDNISolone (MEDROL DOSEPAK) 4 MG Tablet Therapy Pack As directed on the packaging label. (Patient not taking: Reported on 5/10/2025) 21 Tablet 0     No current facility-administered medications on file prior to visit.        Objective:     /72   Pulse 85   Temp 36.9 °C (98.4 °F) (Temporal)   Resp 16   Ht 1.626 m (5' 4\")   Wt 64.9 kg (143 lb 1.6 oz)   SpO2 99%   BMI 24.56 kg/m²     Physical Exam  Vitals reviewed.   Cardiovascular:      Rate and Rhythm: Normal rate.      Pulses: Normal pulses.           Radial pulses are 2+ on the right side.   Pulmonary:      Effort: Pulmonary effort is normal.   Musculoskeletal:      Right forearm: Swelling and tenderness present. No deformity or bony tenderness.        Arms:    Skin:     General: Skin is warm.      Capillary Refill: Capillary refill takes less than 2 seconds.   Neurological:      Mental Status: She is alert and oriented to person, place, and time.   Psychiatric:         Mood and Affect: Mood normal.         Behavior: Behavior normal.         Assessment /Associated Orders:      1. Arm swelling  US-EXTREMITY VENOUS UPPER UNILAT RIGHT      2. Pain of right forearm  US-EXTREMITY VENOUS UPPER UNILAT RIGHT            Medical Decision Making:    Charlette Gomez is a very pleasant 45 y.o. female who is clinically stable at today's acute urgent care visit. Presents with acute problem/ concern today.    No acute distress is noted at the time of the visit.  VSS. Appropriate for outpatient " care at this time.   Etiology of her right arm swelling is unknown at this time.  US : pending   -unable to schedule ultrasound today.  It was scheduled at the first available appointment which was tomorrow morning.  Patient was given strict ER precautions if her symptoms worsen.  She is a low risk for DVT however she does have a traumatic swelling of her arm with no precipitating factors or activities.  Negative history of VTE.  Negative family history of VTE.  No massage or extreme elevation of her arm until after the ultrasound is done.  Through shared decision making a discussion of the Dx and DDx, management options (risks,benefits, and alternatives to planned treatment), natural progression, supportive care and indications for immediate follow-up discussed. Expressed understanding and the treatment plan was agreed upon.    Questions were encouraged and answered     Follow Up:   Return to urgent care prn if new or worsening sx or if there is no improvement in condition prn.    Educated in Red flags and indications to immediately call 911 or present to the Emergency Department.       Time I spent evaluating Charlette Gomez in urgent care today was 30  minutes. This time includes preparing for visit, reviewing any pertinent notes or test results, exam, obtaining HPI, interpretation of lab tests,counseling/education, medication management ( RX and/ or OTC)  and documentation as indicated above.Time does not include separately billable procedures if noted .       Please note that this dictation was created using voice recognition software. I have worked with consultants from the vendor as well as technical experts from [facility] to optimize the interface. I have made every reasonable attempt to correct obvious errors, but I expect that there are errors of grammar and possibly content that I did not discover before finalizing the note.  This note was electronically signed by provider

## 2025-05-11 ENCOUNTER — HOSPITAL ENCOUNTER (OUTPATIENT)
Dept: RADIOLOGY | Facility: MEDICAL CENTER | Age: 45
End: 2025-05-11
Attending: NURSE PRACTITIONER
Payer: COMMERCIAL

## 2025-05-11 ENCOUNTER — RESULTS FOLLOW-UP (OUTPATIENT)
Dept: URGENT CARE | Facility: CLINIC | Age: 45
End: 2025-05-11

## 2025-05-11 DIAGNOSIS — M79.89 ARM SWELLING: ICD-10-CM

## 2025-05-11 DIAGNOSIS — M79.631 PAIN OF RIGHT FOREARM: ICD-10-CM

## 2025-05-11 PROCEDURE — 93971 EXTREMITY STUDY: CPT | Mod: RT

## 2025-05-12 ENCOUNTER — OFFICE VISIT (OUTPATIENT)
Dept: SPORTS MEDICINE | Facility: OTHER | Age: 45
End: 2025-05-12
Payer: COMMERCIAL

## 2025-05-12 VITALS
WEIGHT: 143.1 LBS | SYSTOLIC BLOOD PRESSURE: 118 MMHG | RESPIRATION RATE: 16 BRPM | HEIGHT: 64 IN | HEART RATE: 103 BPM | DIASTOLIC BLOOD PRESSURE: 78 MMHG | OXYGEN SATURATION: 97 % | TEMPERATURE: 98.5 F | BODY MASS INDEX: 24.43 KG/M2

## 2025-05-12 DIAGNOSIS — S76.312A STRAIN OF LEFT HAMSTRING MUSCLE, INITIAL ENCOUNTER: ICD-10-CM

## 2025-05-12 DIAGNOSIS — S80.11XA HEMATOMA OF RIGHT LOWER LEG: ICD-10-CM

## 2025-05-12 PROCEDURE — 99214 OFFICE O/P EST MOD 30 MIN: CPT | Performed by: FAMILY MEDICINE

## 2025-05-12 PROCEDURE — 3078F DIAST BP <80 MM HG: CPT | Performed by: FAMILY MEDICINE

## 2025-05-12 PROCEDURE — 3074F SYST BP LT 130 MM HG: CPT | Performed by: FAMILY MEDICINE

## 2025-05-12 ASSESSMENT — FIBROSIS 4 INDEX: FIB4 SCORE: 0.56

## 2025-05-12 NOTE — PROGRESS NOTES
"Chief Complaint   Patient presents with    Leg Pain     R leg pain into calf & achilles      Subjective     Referred by Lul Beavers P.A.-C.   for evaluation of RIGHT Achilles, posterior ankle pain  Date of injury roughly March 20, 2025  Mechanism, bent forward packing and struck her RIGHT Achilles tendon against a chair or some sort of edge  She had some discomfort, bruising dull pain and symptoms got worse and has been continually worsening over the past 2 weeks  Pain is mostly sharp and aching  Worse with curling her toes and ambulation/walking  She also has worsening of her pain with dorsiflexion of the RIGHT ankle  She has tried ice and stretching with limited improvement in symptoms  Ibuprofen and Tylenol helps some  Pain is 6-7 out of 10 and can be as severe as 8 out of 10  No radiation, no radicular symptoms  She denies any prior injuries or issues with the RIGHT foot or ankle    UPDATE:  LEFT hamstring is IMPROVED   RIGHT calf pain and bruising still present  Seems to be more localized at the medial ankle now  She was on prednisone for an unrelated reason and did feel better on the medication    Sales, remote work, mostly desk work and some traveling  Activities include attending the gym and strength training    Objective   /78 (BP Location: Left arm, Patient Position: Sitting, BP Cuff Size: Adult)   Pulse (!) 103   Temp 36.9 °C (98.5 °F) (Temporal)   Resp 16   Ht 1.626 m (5' 4\")   Wt 64.9 kg (143 lb 1.6 oz)   SpO2 97%   BMI 24.56 kg/m²     RIGHT ANKLE:  There is NO swelling noted at the ankle  Range of motion intact with dorsiflexion and plantarflexion, inversion and eversion  There is NO tenderness of the ATFL, CF or PTF ligament  There is NO tenderness of the lateral malleolus or medial malleolus  Anterior drawer testing is NEGATIVE  Talar tilt testing is NEGATIVE  The foot and ankle is otherwise neurovascularly intact  Unable to toe off    RIGHT FOOT:  There is NO swelling noted at the " foot  There is NO tenderness at the base of the fifth metatarsal, cuboid, or tarsal navicular  There is NO pain with metatarsal squeeze test    LEFT ANKLE:  There is NO swelling noted at the ankle  Range of motion intact with dorsiflexion and plantarflexion, inversion and eversion  There is NO tenderness of the ATFL, CF or PTF ligament  There is NO tenderness of the lateral malleolus or medial malleolus  Anterior drawer testing is NEGATIVE  Talar tilt testing is NEGATIVE  The foot and ankle is otherwise neurovascularly intact    LEFT FOOT:  There is NO swelling noted at the foot  There is NO tenderness at the base of the fifth metatarsal, cuboid, or tarsal navicular  There is NO pain with metatarsal squeeze test    NEUTRAL stance  Able to ambulate with ANTALGIC gait with STIFF RIGHT ankle gait    1. Hematoma of right lower leg (distal calf/proximal achilles region)        2. Strain of left hamstring muscle, initial encounter          Date of injury roughly March 20, 2025  Mechanism, bent forward packing and struck her RIGHT Achilles tendon against a chair or some sort of edge    RIGHT calf region, suspect hematoma which is resolving  LEFT hamstring, suspect strain from working out without fatmata tear    Continue stretching strengthening    UPDATE:  Calf is BETTER  R medial ankle pain is related to resolving hematoma  Generally she is now able to toe off of her RIGHT foot without difficulty    Upon revisit, she mentioned:  new problem MULTIJOINT pain  Mostly at the fingers and RIGHT wrist with associated swelling which is now resolving  History of RA in the family  Her RA/blood workup has been NEGATIVE  Rheumatology consultation is PENDING  She has NOT tried wheat free diet recently, but has been on paleo diet in the past  She does have elevated CRP slightly elevated ESR  Differential includes autoimmune disease, sarcoid, amyloidosis  Has been advised to try wheat free diet and follow-up in 1 month    Return in about  4 weeks (around 6/9/2025).  She is doing at with wheat free diet at that point with regard to her multijoint pain                      Chad Blanco M.D.  [phone]     4/18/2025 Routine     Narrative  RIGHT lower leg Achilles and distal calf region Limited study  Indication RIGHT calf region pain  Short and long axis views obtained  There is some hypoechoic signal in the subcutaneous fat region in the  areas of patient's tenderness  Gastrocnemius and Achilles tendon appear intact  Impression:  Evolving hematoma of the right RIGHT lower leg        Exam Ended: 04/18/25 10:56 AM Last Resulted: 04/18/25 12:24 PM     Thank you Lul Beavers P.A.-C.  For allowing me to participate in caring for your patient.

## 2025-05-14 NOTE — Clinical Note
REFERRAL APPROVAL NOTICE         Sent on May 14, 2025                   Charlette Gomez  [address]  [place] [address]                   Dear Ms. Gomez,    After a careful review of the medical information and benefit coverage, [facility] has processed your referral. See below for additional details.    If applicable, you must be actively enrolled with your insurance for coverage of the authorized service. If you have any questions regarding your coverage, please contact your insurance directly.    REFERRAL INFORMATION   Referral #:  [id number]  Referred-To Provider    Referred-By Provider:  Rheumatology    Jared Barrios M.D.   [facility]      [address]  [address]  [place] [address]  [phone] [address]  [place] [address]  [phone]    Referral Start Date:  05/08/2025  Referral End Date:   05/08/2026             SCHEDULING  If you do not already have an appointment, please call [phone] to make an appointment.     MORE INFORMATION  If you do not already have a [employer] account, sign up at: [email or web address]  You can access your medical information, make appointments, see lab results, billing information, and more.  If you have questions regarding this referral, please contact  the [facility] Referrals department at:             [phone]. Monday - Friday 8:00AM - 5:00PM.     Sincerely,    [facility]

## 2025-05-15 ENCOUNTER — OFFICE VISIT (OUTPATIENT)
Dept: SPORTS MEDICINE | Facility: OTHER | Age: 45
End: 2025-05-15
Payer: COMMERCIAL

## 2025-05-15 VITALS
DIASTOLIC BLOOD PRESSURE: 74 MMHG | HEIGHT: 64 IN | SYSTOLIC BLOOD PRESSURE: 116 MMHG | BODY MASS INDEX: 24.43 KG/M2 | RESPIRATION RATE: 16 BRPM | HEART RATE: 78 BPM | OXYGEN SATURATION: 98 % | TEMPERATURE: 98.2 F | WEIGHT: 143.1 LBS

## 2025-05-15 DIAGNOSIS — S76.312A STRAIN OF LEFT HAMSTRING MUSCLE, INITIAL ENCOUNTER: ICD-10-CM

## 2025-05-15 DIAGNOSIS — G57.62 MORTON'S NEUROMA OF THIRD INTERSPACE OF LEFT FOOT: Primary | ICD-10-CM

## 2025-05-15 DIAGNOSIS — S80.11XA HEMATOMA OF RIGHT LOWER LEG: ICD-10-CM

## 2025-05-15 PROCEDURE — 3074F SYST BP LT 130 MM HG: CPT | Performed by: FAMILY MEDICINE

## 2025-05-15 PROCEDURE — 3078F DIAST BP <80 MM HG: CPT | Performed by: FAMILY MEDICINE

## 2025-05-15 PROCEDURE — 99214 OFFICE O/P EST MOD 30 MIN: CPT | Performed by: FAMILY MEDICINE

## 2025-05-15 ASSESSMENT — FIBROSIS 4 INDEX: FIB4 SCORE: 0.56

## 2025-05-15 NOTE — PROGRESS NOTES
"Chief Complaint   Patient presents with    Leg Pain     R lower leg pain      Subjective     Referred by Lul Beavers P.A.-C.   for evaluation of RIGHT Achilles, posterior ankle pain  Date of injury roughly March 20, 2025  Mechanism, bent forward packing and struck her RIGHT Achilles tendon against a chair or some sort of edge  She had some discomfort, bruising dull pain and symptoms got worse and has been continually worsening over the past 2 weeks  Pain is mostly sharp and aching  Worse with curling her toes and ambulation/walking  She also has worsening of her pain with dorsiflexion of the RIGHT ankle  She has tried ice and stretching with limited improvement in symptoms  Ibuprofen and Tylenol helps some  Pain is 6-7 out of 10 and can be as severe as 8 out of 10  No radiation, no radicular symptoms  She denies any prior injuries or issues with the RIGHT foot or ankle    UPDATE:  NEW problem, LEFT foot pain  Worse in the last few days  3rd interspace pain  Has had intermittent symptoms in this location in the past  Pain is sharp  Worse with weightbearing  No night symptoms  She can feel a bit of pain when she flexes or extends the toes    Sales, remote work, mostly desk work and some traveling  Activities include attending the gym and strength training    Objective   /74 (BP Location: Left arm, Patient Position: Sitting, BP Cuff Size: Adult)   Pulse 78   Temp 36.8 °C (98.2 °F) (Temporal)   Resp 16   Ht 1.626 m (5' 4\")   Wt 64.9 kg (143 lb 1.6 oz)   SpO2 98%     RIGHT FOOT:  There is NO swelling noted at the foot  There is NO tenderness at the base of the fifth metatarsal, cuboid, or tarsal navicular  There is NO pain with metatarsal squeeze test    LEFT FOOT:  There is NO swelling noted at the foot  There is NO tenderness at the base of the fifth metatarsal, cuboid, or tarsal navicular  Positive tenderness of the third interspace of the LEFT foot  There is NO pain with metatarsal squeeze " test    NEUTRAL stance  Able to ambulate with ANTALGIC gait with STIFF RIGHT ankle gait    1. 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 Hematoma of right lower leg (distal calf/proximal achilles region)        3. Strain of left hamstring muscle, initial encounter          Date of injury roughly March 20, 2025  Mechanism, bent forward packing and struck her RIGHT Achilles tendon against a chair or some sort of edge    RIGHT calf region, suspect hematoma which is resolving  LEFT hamstring, suspect strain from working out without fatmata tear    Continue stretching strengthening    Upon revisit, she mentioned:  new problem MULTIJOINT pain  Mostly at the fingers and RIGHT wrist with associated swelling which is now resolving  History of RA in the family  Her RA/blood workup has been NEGATIVE  Rheumatology consultation is PENDING  She has NOT tried wheat free diet recently, but has been on paleo diet in the past  She does have elevated CRP slightly elevated ESR  Differential includes autoimmune disease, sarcoid, amyloidosis  Has been advised to try wheat free diet and follow-up in 1 month    Continue wheat free diet at that point with regard to her multijoint pain    NEW problem, LEFT foot pain  Worse in the last few days  3rd interspace pain  Provided with information on Mendez's neuroma  Recommend wide toebox shoe and supportive footwear  If symptoms persist consider LEFT foot third interspace corticosteroid injection                      Chad Blanco M.D.  [phone]     4/18/2025 Routine     Narrative  RIGHT lower leg Achilles and distal calf region Limited study  Indication RIGHT calf region pain  Short and long axis views obtained  There is some hypoechoic signal in the subcutaneous fat region in the  areas of patient's tenderness  Gastrocnemius and Achilles tendon appear intact  Impression:  Evolving hematoma of the right RIGHT lower leg        Exam Ended: 04/18/25 10:56 AM Last Resulted:  04/18/25 12:24 PM     Thank you Lul Beavers P.A.-C.  For allowing me to participate in caring for your patient.

## 2025-05-27 ENCOUNTER — APPOINTMENT (OUTPATIENT)
Dept: RADIOLOGY | Facility: MEDICAL CENTER | Age: 45
End: 2025-05-27
Attending: STUDENT IN AN ORGANIZED HEALTH CARE EDUCATION/TRAINING PROGRAM
Payer: COMMERCIAL

## 2025-05-27 ENCOUNTER — HOSPITAL ENCOUNTER (EMERGENCY)
Facility: MEDICAL CENTER | Age: 45
End: 2025-05-28
Attending: STUDENT IN AN ORGANIZED HEALTH CARE EDUCATION/TRAINING PROGRAM
Payer: COMMERCIAL

## 2025-05-27 VITALS
DIASTOLIC BLOOD PRESSURE: 78 MMHG | OXYGEN SATURATION: 99 % | SYSTOLIC BLOOD PRESSURE: 115 MMHG | BODY MASS INDEX: 24.58 KG/M2 | HEART RATE: 98 BPM | WEIGHT: 143.96 LBS | TEMPERATURE: 98.5 F | HEIGHT: 64 IN | RESPIRATION RATE: 18 BRPM

## 2025-05-27 DIAGNOSIS — M79.661 RIGHT CALF PAIN: Primary | ICD-10-CM

## 2025-05-27 LAB
ALBUMIN SERPL BCP-MCNC: 3.9 G/DL (ref 3.2–4.9)
ALBUMIN/GLOB SERPL: 1 G/DL
ALP SERPL-CCNC: 82 U/L (ref 30–99)
ALT SERPL-CCNC: 15 U/L (ref 2–50)
ANION GAP SERPL CALC-SCNC: 11 MMOL/L (ref 7–16)
APTT PPP: 28.1 SEC (ref 24.7–36)
AST SERPL-CCNC: 23 U/L (ref 12–45)
BASOPHILS # BLD AUTO: 0.3 % (ref 0–1.8)
BASOPHILS # BLD: 0.03 K/UL (ref 0–0.12)
BILIRUB SERPL-MCNC: <0.2 MG/DL (ref 0.1–1.5)
BUN SERPL-MCNC: 14 MG/DL (ref 8–22)
CALCIUM ALBUM COR SERPL-MCNC: 9.6 MG/DL (ref 8.5–10.5)
CALCIUM SERPL-MCNC: 9.5 MG/DL (ref 8.5–10.5)
CHLORIDE SERPL-SCNC: 99 MMOL/L (ref 96–112)
CO2 SERPL-SCNC: 27 MMOL/L (ref 20–33)
CREAT SERPL-MCNC: 0.69 MG/DL (ref 0.5–1.4)
EOSINOPHIL # BLD AUTO: 0.06 K/UL (ref 0–0.51)
EOSINOPHIL NFR BLD: 0.7 % (ref 0–6.9)
ERYTHROCYTE [DISTWIDTH] IN BLOOD BY AUTOMATED COUNT: 44.5 FL (ref 35.9–50)
GFR SERPLBLD CREATININE-BSD FMLA CKD-EPI: 110 ML/MIN/1.73 M 2
GLOBULIN SER CALC-MCNC: 4 G/DL (ref 1.9–3.5)
GLUCOSE SERPL-MCNC: 91 MG/DL (ref 65–99)
HCT VFR BLD AUTO: 36.8 % (ref 37–47)
HGB BLD-MCNC: 11.6 G/DL (ref 12–16)
IMM GRANULOCYTES # BLD AUTO: 0.04 K/UL (ref 0–0.11)
IMM GRANULOCYTES NFR BLD AUTO: 0.4 % (ref 0–0.9)
INR PPP: 1.01 (ref 0.87–1.13)
LYMPHOCYTES # BLD AUTO: 2.4 K/UL (ref 1–4.8)
LYMPHOCYTES NFR BLD: 26.4 % (ref 22–41)
MAGNESIUM SERPL-MCNC: 2.3 MG/DL (ref 1.5–2.5)
MCH RBC QN AUTO: 29.3 PG (ref 27–33)
MCHC RBC AUTO-ENTMCNC: 31.5 G/DL (ref 32.2–35.5)
MCV RBC AUTO: 92.9 FL (ref 81.4–97.8)
MONOCYTES # BLD AUTO: 0.66 K/UL (ref 0–0.85)
MONOCYTES NFR BLD AUTO: 7.3 % (ref 0–13.4)
NEUTROPHILS # BLD AUTO: 5.89 K/UL (ref 1.82–7.42)
NEUTROPHILS NFR BLD: 64.9 % (ref 44–72)
NRBC # BLD AUTO: 0 K/UL
NRBC BLD-RTO: 0 /100 WBC (ref 0–0.2)
PLATELET # BLD AUTO: 364 K/UL (ref 164–446)
PMV BLD AUTO: 9.7 FL (ref 9–12.9)
POTASSIUM SERPL-SCNC: 4.1 MMOL/L (ref 3.6–5.5)
PROT SERPL-MCNC: 7.9 G/DL (ref 6–8.2)
PROTHROMBIN TIME: 13.6 SEC (ref 12–14.6)
RBC # BLD AUTO: 3.96 M/UL (ref 4.2–5.4)
SODIUM SERPL-SCNC: 137 MMOL/L (ref 135–145)
WBC # BLD AUTO: 9.1 K/UL (ref 4.8–10.8)

## 2025-05-27 PROCEDURE — 85025 COMPLETE CBC W/AUTO DIFF WBC: CPT

## 2025-05-27 PROCEDURE — 93971 EXTREMITY STUDY: CPT | Mod: RT

## 2025-05-27 PROCEDURE — 80053 COMPREHEN METABOLIC PANEL: CPT

## 2025-05-27 PROCEDURE — 99283 EMERGENCY DEPT VISIT LOW MDM: CPT

## 2025-05-27 PROCEDURE — 83735 ASSAY OF MAGNESIUM: CPT

## 2025-05-27 PROCEDURE — 85730 THROMBOPLASTIN TIME PARTIAL: CPT

## 2025-05-27 PROCEDURE — 36415 COLL VENOUS BLD VENIPUNCTURE: CPT

## 2025-05-27 PROCEDURE — 85610 PROTHROMBIN TIME: CPT

## 2025-05-27 ASSESSMENT — FIBROSIS 4 INDEX: FIB4 SCORE: 0.56

## 2025-05-27 ASSESSMENT — PAIN DESCRIPTION - DESCRIPTORS: DESCRIPTORS: CRAMPING;ACHING

## 2025-05-27 NOTE — Clinical Note
Charlette Gomez was seen and treated in our emergency department on 5/27/2025.  She may return to work on 05/29/2025.       If you have any questions or concerns, please don't hesitate to call.      Julia Pardo M.D.

## 2025-05-28 NOTE — DISCHARGE INSTRUCTIONS
You were seen for evaluation of right calf pain.  Your labs are reassuring.  Ultrasound shows no evidence of blood clot.  Please follow-up with sports medicine.  Return to the emergency room for any new or worsening symptoms.

## 2025-05-28 NOTE — ED TRIAGE NOTES
"Chief Complaint   Patient presents with    Leg Pain     Pt walks in with family c/o  RT leg/calf pain.  PT dx with hematoma in RT calf in April 2025 and pt states it feels like it's getting worse. C/o cramping and spasms     /83   Pulse (!) 114   Temp 36.9 °C (98.5 °F) (Temporal)   Resp 18   Ht 1.626 m (5' 4\")   Wt 65.3 kg (143 lb 15.4 oz)   LMP 05/14/2025   SpO2 100%   BMI 24.71 kg/m²     "

## 2025-05-28 NOTE — ED PROVIDER NOTES
ED Provider Note    CHIEF COMPLAINT  Chief Complaint   Patient presents with    Leg Pain     Pt walks in with family c/o  RT leg/calf pain.  PT dx with hematoma in RT calf in April 2025 and pt states it feels like it's getting worse. C/o cramping and spasms       EXTERNAL RECORDS REVIEWED  Outpatient Notes patient was seen by sports medicine May 12, 2025 due to evaluation of right Achilles, posterior ankle pain.  The date of injury was roughly March 20, 2025 after she bent forward packing and struck her right Achilles tendon against a chair or some sort of edge.  She had an ultrasound done there which showed evolving hematoma of the right lower leg. She was seen by sports medicine again for left foot pain which was worse in the last few days, third interspace pain, has had intermittent symptoms in this location in the past. Diagnosed with Mendez's neuroma, and they recommended wide toebox show and supportive footwear and to consider left foot third interspace corticosteroid injection if symptoms persist    HPI/ROS  LIMITATION TO HISTORY   Select: : None  OUTSIDE HISTORIAN(S):  None    Charlette Gomez is a 45 y.o. female who presents for evaluation of right calf pain that has been present for the past 3 days.  Patient states that in March she bumped the right Achilles area on a chair some sort of edge.  She has a pain in that area and she has been seeing sports medicine for this.  She had ultrasound done and was told that she has a hematoma of the right lower leg.  Over the past 3 nights, she has been having some cramping in the right calf and has been higher up in the leg than previously.  She has been alternating Tylenol and Motrin and the pain is persistent.  It is cramping and spasming.  She wants to make sure that the hematoma is not getting worse and wants to see if the hematoma can be drained.  She is not on any blood thinners.  She denies any difficulty breathing.  She has no fevers or rash.  She  "notes that the pain is worse at night and is better with movement.    PAST MEDICAL HISTORY   She has no chronic medical problems    SURGICAL HISTORY   has a past surgical history that includes other.    FAMILY HISTORY  Family History   Problem Relation Age of Onset    Thyroid Mother     Heart Disease Father     Hypertension Father     Cancer Paternal Grandmother         NECK CANCER    Cancer Paternal Aunt     Breast Cancer Paternal Aunt        SOCIAL HISTORY  Social History     Tobacco Use    Smoking status: Never     Passive exposure: Never    Smokeless tobacco: Never   Vaping Use    Vaping status: Never Used   Substance and Sexual Activity    Alcohol use: Yes     Alcohol/week: 1.8 oz     Types: 3 Glasses of wine per week     Comment: social    Drug use: Never    Sexual activity: Yes     Partners: Male     Birth control/protection: None     Comment: Work in [employer]       CURRENT MEDICATIONS  Home Medications       Reviewed by Siena Apodaca R.N. (Registered Nurse) on 05/27/25 at 2141  Med List Status: <None>     Medication Last Dose Status   buPROPion (WELLBUTRIN XL) 300 MG XL tablet  Active   Cholecalciferol (VITAMIN D-3 PO)  Active   cyclobenzaprine (FLEXERIL) 5 mg tablet  Active   MAGNESIUM GLYCINATE PO  Active   methylPREDNISolone (MEDROL DOSEPAK) 4 MG Tablet Therapy Pack  Active   multivitamin Tab  Active                    ALLERGIES  Allergies[1]    PHYSICAL EXAM  VITAL SIGNS: /83   Pulse (!) 114   Temp 36.9 °C (98.5 °F) (Temporal)   Resp 18   Ht 1.626 m (5' 4\")   Wt 65.3 kg (143 lb 15.4 oz)   LMP 05/14/2025   SpO2 100%   BMI 24.71 kg/m²      Constitutional: Well developed, Well nourished, No acute respiratory distress, Non-toxic appearance.   HENT: Normocephalic, Atraumatic  Cardiovascular: Normal heart rate, Normal rhythm, No murmurs, No rubs, No gallops.   Thorax & Lungs: Clear to auscultation bilaterally, No respiratory distress, No wheezing.  Abdomen: Soft nontender no rebound masses or " peritoneal signs  Skin: Warm, Dry, No erythema, No rash.   Extremities: Intact distal pulses, No edema, To the right lower extremity, there is tenderness to the mid calf, no increased warmth to touch, no appreciable hematoma palpated  Musculoskeletal: Good range of motion in all major joints. Normal gait.  Neurologic: Alert & oriented. No focal deficits noted.   Psych: Alert normal affect     EKG/LABS  Results for orders placed or performed during the hospital encounter of 05/27/25   CBC WITH DIFFERENTIAL    Collection Time: 05/27/25 11:02 PM   Result Value Ref Range    WBC 9.1 4.8 - 10.8 K/uL    RBC 3.96 (L) 4.20 - 5.40 M/uL    Hemoglobin 11.6 (L) 12.0 - 16.0 g/dL    Hematocrit 36.8 (L) 37.0 - 47.0 %    MCV 92.9 81.4 - 97.8 fL    MCH 29.3 27.0 - 33.0 pg    MCHC 31.5 (L) 32.2 - 35.5 g/dL    RDW 44.5 35.9 - 50.0 fL    Platelet Count 364 164 - 446 K/uL    MPV 9.7 9.0 - 12.9 fL    Neutrophils-Polys 64.90 44.00 - 72.00 %    Lymphocytes 26.40 22.00 - 41.00 %    Monocytes 7.30 0.00 - 13.40 %    Eosinophils 0.70 0.00 - 6.90 %    Basophils 0.30 0.00 - 1.80 %    Immature Granulocytes 0.40 0.00 - 0.90 %    Nucleated RBC 0.00 0.00 - 0.20 /100 WBC    Neutrophils (Absolute) 5.89 1.82 - 7.42 K/uL    Lymphs (Absolute) 2.40 1.00 - 4.80 K/uL    Monos (Absolute) 0.66 0.00 - 0.85 K/uL    Eos (Absolute) 0.06 0.00 - 0.51 K/uL    Baso (Absolute) 0.03 0.00 - 0.12 K/uL    Immature Granulocytes (abs) 0.04 0.00 - 0.11 K/uL    NRBC (Absolute) 0.00 K/uL   COMP METABOLIC PANEL    Collection Time: 05/27/25 11:02 PM   Result Value Ref Range    Sodium 137 135 - 145 mmol/L    Potassium 4.1 3.6 - 5.5 mmol/L    Chloride 99 96 - 112 mmol/L    Co2 27 20 - 33 mmol/L    Anion Gap 11.0 7.0 - 16.0    Glucose 91 65 - 99 mg/dL    Bun 14 8 - 22 mg/dL    Creatinine 0.69 0.50 - 1.40 mg/dL    Calcium 9.5 8.5 - 10.5 mg/dL    Correct Calcium 9.6 8.5 - 10.5 mg/dL    AST(SGOT) 23 12 - 45 U/L    ALT(SGPT) 15 2 - 50 U/L    Alkaline Phosphatase 82 30 - 99 U/L    Total  Bilirubin <0.2 0.1 - 1.5 mg/dL    Albumin 3.9 3.2 - 4.9 g/dL    Total Protein 7.9 6.0 - 8.2 g/dL    Globulin 4.0 (H) 1.9 - 3.5 g/dL    A-G Ratio 1.0 g/dL   MAGNESIUM    Collection Time: 05/27/25 11:02 PM   Result Value Ref Range    Magnesium 2.3 1.5 - 2.5 mg/dL   PROTHROMBIN TIME (INR)    Collection Time: 05/27/25 11:02 PM   Result Value Ref Range    PT 13.6 12.0 - 14.6 sec    INR 1.01 0.87 - 1.13   APTT    Collection Time: 05/27/25 11:02 PM   Result Value Ref Range    APTT 28.1 24.7 - 36.0 sec   ESTIMATED GFR    Collection Time: 05/27/25 11:02 PM   Result Value Ref Range    GFR (CKD-EPI) 110 >60 mL/min/1.73 m 2       I have independently interpreted this EKG    RADIOLOGY/PROCEDURES   I have independently interpreted the diagnostic imaging associated with this visit and am waiting the final reading from the radiologist.   My preliminary interpretation is as follows: veins are compressible    Radiologist interpretation:  US-EXTREMITY VENOUS LOWER UNILAT RIGHT   Final Result         1.  No evidence of right lower extremity deep venous thrombosis.          COURSE & MEDICAL DECISION MAKING    ASSESSMENT, COURSE AND PLAN  Care Narrative:   This is a pleasant 45-year-old female with history as noted above who is presenting for evaluation of right calf pain for the past 3 days.  Patient had an injury overlying the right Achilles tendon approximately 2 months ago.  She is been following up with sports medicine in regards to this.  She had ultrasound of the area done in April and was told that she had a evolving hematoma.  She notes that the pain is now a little bit further up on her calf and has been worse over the past 3 days therefore she decided come here to be evaluated.  On arrival, her vital signs are stable.  She does have some tenderness to the mid calf but there is no unilateral leg swelling.  There is no signs of infection to suggest abscess, infected hematoma or cellulitis.  There is no appreciable hematoma on  exam.  The right lower extremity is neurovascular intact.  No bony tenderness, doubt fracture.  Differential diagnose includes not limited to DVT, electrolyte abnormality, hematoma.     Labs reassuring with no leukocytosis, stable anemia seen.  LFTs within normal limits.  DVT ultrasound negative for DVT.  Results were discussed with patient, recommend follow-up with sports medicine as she has been following up with.  She will continue with Tylenol, ibuprofen, rest, ice, elevation.  She is advised to return for any worsening pain, fevers, any other concerns.  She is agreeable to discharge plan with no further questions.            ADDITIONAL PROBLEMS MANAGED  None    DISPOSITION AND DISCUSSIONS  I have discussed management of the patient with the following physicians and ZAIRA's:    None    Discussion of management with other \[facility] [place] [facility]\"" or appropriate source(s): None     Escalation of care considered, and ultimately not performed:acute inpatient care management, however at this time, the patient is most appropriate for outpatient management    Barriers to care at this time, including but not limited to: None.     Decision tools and prescription drugs considered including, but not limited to: None.     The patient will return for new or worsening symptoms and is stable at the time of discharge.    The patient is referred to a primary physician for blood pressure management, diabetic screening, and for all other preventative health concerns.      DISPOSITION:  Patient will be discharged home in stable condition.    FOLLOW UP:  Jared Barrios M.D.  [address]  [address]  [facility] [address]  [phone]          [facility], Emergency Dept  [address]  [facility] [address]  [phone]        Chad Blanco M.D.  [address]  [facility] [address]  [phone]            OUTPATIENT MEDICATIONS:  Discharge Medication List as of 5/28/2025 12:09 AM            FINAL DIAGNOSIS  1.  Right calf pain Acute        Electronically signed by: Julia Pardo M.D., 5/27/2025 10:38 PM           [1] No Known Allergies

## 2025-05-30 NOTE — Clinical Note
REFERRAL APPROVAL NOTICE         Sent on May 30, 2025                   Charlette Tee Tristanmiles  [address]  [place] [address]                   Dear Ms. Gomez,    After a careful review of the medical information and benefit coverage, [facility] has processed your referral. See below for additional details.    If applicable, you must be actively enrolled with your insurance for coverage of the authorized service. If you have any questions regarding your coverage, please contact your insurance directly.    REFERRAL INFORMATION   Referral #:  [id number]  Referred-To Provider    Referred-By Provider:  Rheumatology    Jared Barrios M.D.   [facility] [place] [facility]      [address]  [address]  [place] [address]  [phone] No address on file  ***    Referral Start Date:  05/29/2025  Referral End Date:   05/29/2026             SCHEDULING  If you do not already have an appointment, please call *** to make an appointment.     MORE INFORMATION  If you do not already have a [employer] account, sign up at: [email or web address]  You can access your medical information, make appointments, see lab results, billing information, and more.  If you have questions regarding this referral, please contact  the [facility] Referrals department at:             [phone]. Monday - Friday 8:00AM - 5:00PM.     Sincerely,    [facility]

## 2025-06-02 ENCOUNTER — APPOINTMENT (OUTPATIENT)
Dept: SPORTS MEDICINE | Facility: OTHER | Age: 45
End: 2025-06-02
Payer: COMMERCIAL

## 2025-06-02 VITALS
WEIGHT: 143.96 LBS | BODY MASS INDEX: 24.58 KG/M2 | HEART RATE: 118 BPM | HEIGHT: 64 IN | TEMPERATURE: 98.2 F | OXYGEN SATURATION: 97 % | RESPIRATION RATE: 18 BRPM | SYSTOLIC BLOOD PRESSURE: 118 MMHG | DIASTOLIC BLOOD PRESSURE: 80 MMHG

## 2025-06-02 DIAGNOSIS — S76.312D STRAIN OF LEFT HAMSTRING MUSCLE, SUBSEQUENT ENCOUNTER: ICD-10-CM

## 2025-06-02 DIAGNOSIS — G57.62 MORTON'S NEUROMA OF THIRD INTERSPACE OF LEFT FOOT: Primary | ICD-10-CM

## 2025-06-02 DIAGNOSIS — S80.11XA HEMATOMA OF RIGHT LOWER LEG: ICD-10-CM

## 2025-06-02 PROCEDURE — 3074F SYST BP LT 130 MM HG: CPT | Performed by: FAMILY MEDICINE

## 2025-06-02 PROCEDURE — 3079F DIAST BP 80-89 MM HG: CPT | Performed by: FAMILY MEDICINE

## 2025-06-02 PROCEDURE — 99213 OFFICE O/P EST LOW 20 MIN: CPT | Mod: 25 | Performed by: FAMILY MEDICINE

## 2025-06-02 PROCEDURE — 64455 NJX AA&/STRD PLTR COM DG NRV: CPT | Mod: LT | Performed by: FAMILY MEDICINE

## 2025-06-02 RX ORDER — TRIAMCINOLONE ACETONIDE 40 MG/ML
20 INJECTION, SUSPENSION INTRA-ARTICULAR; INTRAMUSCULAR ONCE
Status: COMPLETED | OUTPATIENT
Start: 2025-06-02 | End: 2025-06-02

## 2025-06-02 RX ADMIN — TRIAMCINOLONE ACETONIDE 20 MG: 40 INJECTION, SUSPENSION INTRA-ARTICULAR; INTRAMUSCULAR at 15:14

## 2025-06-02 ASSESSMENT — FIBROSIS 4 INDEX: FIB4 SCORE: 0.7

## 2025-06-02 NOTE — PROCEDURES
PROCEDURE NOTE:  left FOOT third interspace Mendez's neuroma corticosteroid injection  Risks and benefits discussed  Informed consent obtained  Ankle prepped in sterile fashion utilizing a pablo- lateral approach  20 mg of Kenalog and 0.5 cc of bupivacaine injected into the Left Foot Third Interspace  Vapocoolant spray was utilized  Patient tolerated the procedure well  Postprocedure care and red flags discussed

## 2025-06-02 NOTE — PROGRESS NOTES
"Chief Complaint   Patient presents with    Leg Pain     R lower leg pain     Foot Pain     L foot pain      Subjective     Referred by Lul Beavers P.A.-C.   for evaluation of RIGHT Achilles, posterior ankle pain  Date of injury roughly March 20, 2025  Mechanism, bent forward packing and struck her RIGHT Achilles tendon against a chair or some sort of edge  She had some discomfort, bruising dull pain and symptoms got worse and has been continually worsening over the past 2 weeks  Pain is mostly sharp and aching  Worse with curling her toes and ambulation/walking  She also has worsening of her pain with dorsiflexion of the RIGHT ankle  She has tried ice and stretching with limited improvement in symptoms  Ibuprofen and Tylenol helps some  Pain is 6-7 out of 10 and can be as severe as 8 out of 10  No radiation, no radicular symptoms  She denies any prior injuries or issues with the RIGHT foot or ankle    UPDATE:  LEFT foot pain worse   3rd interspace pain  Worse with weightbearing    RIGHT calf pain is also worse  Does walk her dog intermittently for approximately 30 minutes    Sales, remote work, mostly desk work and some traveling  Activities include attending the gym and strength training    Objective   /80 (BP Location: Left arm, Patient Position: Sitting, BP Cuff Size: Adult)   Pulse (!) 118   Temp 36.8 °C (98.2 °F) (Temporal)   Resp 18   Ht 1.626 m (5' 4\")   Wt 65.3 kg (143 lb 15.4 oz)   LMP 05/14/2025   SpO2 97%   BMI 24.71 kg/m²     RIGHT calf tenderness at the medial musculotendinous junction region    LEFT FOOT:  There is NO swelling noted at the foot  There is NO tenderness at the base of the fifth metatarsal, cuboid, or tarsal navicular  Positive tenderness of the third interspace of the LEFT foot  There is NO pain with metatarsal squeeze test    NEUTRAL stance  Able to ambulate with ANTALGIC gait with STIFF RIGHT ankle gait    1. 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 Hematoma of right lower leg (distal calf/proximal achilles region)        3. Strain of left hamstring muscle, subsequent encounter          Date of injury roughly March 20, 2025  Mechanism, bent forward packing and struck her RIGHT Achilles tendon against a chair or some sort of edge    RIGHT calf region, suspect hematoma which is resolving  LEFT hamstring, suspect strain from working out without fatmata tear    Continue stretching strengthening    Upon revisit, she mentioned:  MULTIJOINT pain  Mostly at the fingers and RIGHT wrist with associated swelling which is now resolving  History of RA in the family  Rheumatology consultation is PENDING    NEW problem, LEFT foot pain  Worse in the last few days  3rd interspace pain  Provided with information on Mendez's neuroma  Recommend wide toebox shoe and supportive footwear  If symptoms persist consider LEFT foot third interspace corticosteroid injection    Return in about 3 weeks (around 6/23/2025).  To see how she is doing after LEFT Mendez's neuroma injection  We discussed her RIGHT gastrocnemius strain  Offered CAM Walker boot, patient politely declined  She will switch her activities to swimming or cycling and hold off on her weightbearing/walking  We will have to put her in a cam walker boot if her symptoms persist                    Chad Blanco M.D.  [phone]     4/18/2025 Routine     Narrative  RIGHT lower leg Achilles and distal calf region Limited study  Indication RIGHT calf region pain  Short and long axis views obtained  There is some hypoechoic signal in the subcutaneous fat region in the  areas of patient's tenderness  Gastrocnemius and Achilles tendon appear intact  Impression:  Evolving hematoma of the right RIGHT lower leg        Exam Ended: 04/18/25 10:56 AM Last Resulted: 04/18/25 12:24 PM     Thank you Lul Beavers P.A.-C.  For allowing me to participate in caring for your  patient.

## 2025-06-04 ENCOUNTER — HOSPITAL ENCOUNTER (OUTPATIENT)
Dept: RADIOLOGY | Facility: MEDICAL CENTER | Age: 45
End: 2025-06-04
Attending: NURSE PRACTITIONER
Payer: COMMERCIAL

## 2025-06-04 ENCOUNTER — HOSPITAL ENCOUNTER (OUTPATIENT)
Dept: LAB | Facility: MEDICAL CENTER | Age: 45
End: 2025-06-04
Attending: NURSE PRACTITIONER
Payer: COMMERCIAL

## 2025-06-04 DIAGNOSIS — M65.972 SYNOVITIS OF LEFT FOOT: ICD-10-CM

## 2025-06-04 DIAGNOSIS — M25.539 PAIN IN JOINT INVOLVING FOREARM, UNSPECIFIED LATERALITY: ICD-10-CM

## 2025-06-04 LAB
BASOPHILS # BLD AUTO: 0.2 % (ref 0–1.8)
BASOPHILS # BLD: 0.02 K/UL (ref 0–0.12)
EOSINOPHIL # BLD AUTO: 0.07 K/UL (ref 0–0.51)
EOSINOPHIL NFR BLD: 0.6 % (ref 0–6.9)
ERYTHROCYTE [DISTWIDTH] IN BLOOD BY AUTOMATED COUNT: 46 FL (ref 35.9–50)
ERYTHROCYTE [SEDIMENTATION RATE] IN BLOOD BY WESTERGREN METHOD: 37 MM/HOUR (ref 0–25)
HCT VFR BLD AUTO: 38.2 % (ref 37–47)
HGB BLD-MCNC: 11.6 G/DL (ref 12–16)
IMM GRANULOCYTES # BLD AUTO: 0.05 K/UL (ref 0–0.11)
IMM GRANULOCYTES NFR BLD AUTO: 0.4 % (ref 0–0.9)
LYMPHOCYTES # BLD AUTO: 2.41 K/UL (ref 1–4.8)
LYMPHOCYTES NFR BLD: 20.6 % (ref 22–41)
MCH RBC QN AUTO: 28 PG (ref 27–33)
MCHC RBC AUTO-ENTMCNC: 30.4 G/DL (ref 32.2–35.5)
MCV RBC AUTO: 92 FL (ref 81.4–97.8)
MONOCYTES # BLD AUTO: 0.75 K/UL (ref 0–0.85)
MONOCYTES NFR BLD AUTO: 6.4 % (ref 0–13.4)
NEUTROPHILS # BLD AUTO: 8.38 K/UL (ref 1.82–7.42)
NEUTROPHILS NFR BLD: 71.8 % (ref 44–72)
NRBC # BLD AUTO: 0 K/UL
NRBC BLD-RTO: 0 /100 WBC (ref 0–0.2)
PLATELET # BLD AUTO: 408 K/UL (ref 164–446)
PMV BLD AUTO: 10.2 FL (ref 9–12.9)
RBC # BLD AUTO: 4.15 M/UL (ref 4.2–5.4)
WBC # BLD AUTO: 11.7 K/UL (ref 4.8–10.8)

## 2025-06-04 PROCEDURE — 87340 HEPATITIS B SURFACE AG IA: CPT

## 2025-06-04 PROCEDURE — 82306 VITAMIN D 25 HYDROXY: CPT

## 2025-06-04 PROCEDURE — 86704 HEP B CORE ANTIBODY TOTAL: CPT

## 2025-06-04 PROCEDURE — 73110 X-RAY EXAM OF WRIST: CPT | Mod: RT

## 2025-06-04 PROCEDURE — 84439 ASSAY OF FREE THYROXINE: CPT

## 2025-06-04 PROCEDURE — 85652 RBC SED RATE AUTOMATED: CPT

## 2025-06-04 PROCEDURE — 84443 ASSAY THYROID STIM HORMONE: CPT

## 2025-06-04 PROCEDURE — 85025 COMPLETE CBC W/AUTO DIFF WBC: CPT

## 2025-06-04 PROCEDURE — 82607 VITAMIN B-12: CPT

## 2025-06-04 PROCEDURE — 86200 CCP ANTIBODY: CPT

## 2025-06-04 PROCEDURE — 73630 X-RAY EXAM OF FOOT: CPT | Mod: LT

## 2025-06-04 PROCEDURE — 86812 HLA TYPING A B OR C: CPT

## 2025-06-04 PROCEDURE — 36415 COLL VENOUS BLD VENIPUNCTURE: CPT

## 2025-06-04 PROCEDURE — 82746 ASSAY OF FOLIC ACID SERUM: CPT

## 2025-06-04 PROCEDURE — 84550 ASSAY OF BLOOD/URIC ACID: CPT

## 2025-06-04 PROCEDURE — 86431 RHEUMATOID FACTOR QUANT: CPT

## 2025-06-04 PROCEDURE — 83516 IMMUNOASSAY NONANTIBODY: CPT

## 2025-06-04 PROCEDURE — 86803 HEPATITIS C AB TEST: CPT

## 2025-06-04 PROCEDURE — 80053 COMPREHEN METABOLIC PANEL: CPT

## 2025-06-04 PROCEDURE — 86706 HEP B SURFACE ANTIBODY: CPT

## 2025-06-04 PROCEDURE — 86140 C-REACTIVE PROTEIN: CPT

## 2025-06-04 PROCEDURE — 83520 IMMUNOASSAY QUANT NOS NONAB: CPT

## 2025-06-05 LAB
25(OH)D3 SERPL-MCNC: 32 NG/ML (ref 30–100)
ALBUMIN SERPL BCP-MCNC: 4.1 G/DL (ref 3.2–4.9)
ALBUMIN/GLOB SERPL: 1 G/DL
ALP SERPL-CCNC: 86 U/L (ref 30–99)
ALT SERPL-CCNC: 17 U/L (ref 2–50)
ANION GAP SERPL CALC-SCNC: 11 MMOL/L (ref 7–16)
AST SERPL-CCNC: 25 U/L (ref 12–45)
BILIRUB SERPL-MCNC: 0.4 MG/DL (ref 0.1–1.5)
BUN SERPL-MCNC: 12 MG/DL (ref 8–22)
CALCIUM ALBUM COR SERPL-MCNC: 9.5 MG/DL (ref 8.5–10.5)
CALCIUM SERPL-MCNC: 9.6 MG/DL (ref 8.5–10.5)
CHLORIDE SERPL-SCNC: 99 MMOL/L (ref 96–112)
CO2 SERPL-SCNC: 25 MMOL/L (ref 20–33)
CREAT SERPL-MCNC: 0.72 MG/DL (ref 0.5–1.4)
CRP SERPL HS-MCNC: 3.21 MG/DL (ref 0–0.75)
FOLATE SERPL-MCNC: 25.3 NG/ML
GFR SERPLBLD CREATININE-BSD FMLA CKD-EPI: 106 ML/MIN/1.73 M 2
GLOBULIN SER CALC-MCNC: 4.1 G/DL (ref 1.9–3.5)
GLUCOSE SERPL-MCNC: 104 MG/DL (ref 65–99)
HBV CORE AB SERPL QL IA: NONREACTIVE
HBV SURFACE AB SERPL IA-ACNC: ABNORMAL MIU/ML (ref 0–10)
HBV SURFACE AG SER QL: NORMAL
HCV AB SER QL: NORMAL
POTASSIUM SERPL-SCNC: 4.1 MMOL/L (ref 3.6–5.5)
PROT SERPL-MCNC: 8.2 G/DL (ref 6–8.2)
SODIUM SERPL-SCNC: 135 MMOL/L (ref 135–145)
T4 FREE SERPL-MCNC: 1.3 NG/DL (ref 0.93–1.7)
TSH SERPL-ACNC: 2.46 UIU/ML (ref 0.38–5.33)
URATE SERPL-MCNC: 4.7 MG/DL (ref 1.9–8.2)
VIT B12 SERPL-MCNC: 1161 PG/ML (ref 211–911)

## 2025-06-06 LAB — HLA-B27 QL FC: NEGATIVE

## 2025-06-18 ENCOUNTER — OFFICE VISIT (OUTPATIENT)
Dept: MEDICAL GROUP | Facility: MEDICAL CENTER | Age: 45
End: 2025-06-18
Payer: COMMERCIAL

## 2025-06-18 VITALS
TEMPERATURE: 99.2 F | DIASTOLIC BLOOD PRESSURE: 68 MMHG | WEIGHT: 140.43 LBS | OXYGEN SATURATION: 98 % | BODY MASS INDEX: 23.98 KG/M2 | SYSTOLIC BLOOD PRESSURE: 106 MMHG | HEART RATE: 107 BPM | HEIGHT: 64 IN

## 2025-06-18 DIAGNOSIS — G57.62 MORTON'S NEUROMA OF LEFT FOOT: ICD-10-CM

## 2025-06-18 DIAGNOSIS — M25.50 POLYARTHRALGIA: ICD-10-CM

## 2025-06-18 DIAGNOSIS — M67.971 ACHILLES TENDON DISORDER, RIGHT: ICD-10-CM

## 2025-06-18 DIAGNOSIS — S80.11XA HEMATOMA OF RIGHT LOWER LEG: Primary | ICD-10-CM

## 2025-06-18 PROCEDURE — 3078F DIAST BP <80 MM HG: CPT | Performed by: FAMILY MEDICINE

## 2025-06-18 PROCEDURE — 3074F SYST BP LT 130 MM HG: CPT | Performed by: FAMILY MEDICINE

## 2025-06-18 PROCEDURE — 99214 OFFICE O/P EST MOD 30 MIN: CPT | Performed by: FAMILY MEDICINE

## 2025-06-18 ASSESSMENT — ENCOUNTER SYMPTOMS
PALPITATIONS: 0
CHILLS: 0
FEVER: 0

## 2025-06-18 ASSESSMENT — FIBROSIS 4 INDEX: FIB4 SCORE: 0.64

## 2025-06-18 NOTE — PROGRESS NOTES
Verbal consent was acquired by the patient to use [employer] ambient listening note generation during this visit.      Charlette was seen today for follow-up.    Diagnoses and all orders for this visit:    Hematoma of right lower leg  -     Referral to Orthopedics    Achilles tendon disorder, right  -     Referral to Orthopedics    Polyarthralgia    Mendez's neuroma of left foot                  Assessment & Plan  1. Hematoma of the right lower leg  Chronic condition, unstable  - Patient reports ultrasound in 03/2025 showed internal bleeding and a hematoma  - Pain has improved but worsens with exertion  - Increased pain with exertion  - MRI will be ordered to evaluate for potential tears in tendons or muscles  - Referral to [facility] for further evaluation and MRI    2. Pain at the Achilles tendon  Chronic condition, unstable  - Pain worsens with exertion  - Previously advised to move the leg, later instructed to use a boot  - MRI will be ordered to assess for potential tears  - Referral to [facility] for further evaluation and MRI    3. Polymyalgia rheumatica (PMR) versus reactive arthritis due to polyarthralgia  Chronic condition, unstable  - ARETHA titers and rheumatoid arthritis panel negative, but ESR and CRP levels elevated, consistent with PMR  - Experiencing shoulder and hip pain, as well as lower extremity pain  - Elevated ESR and CRP levels  - Advised to follow an anti-inflammatory diet, including turmeric and coenzyme Q10 supplements  - Start a 30-day course of meloxicam after completing the MRI to avoid masking inflammation    4. Mendez's neuroma of the left foot  - Significant pain from the end of 03/2025 to three weeks ago, improved after an injection  - Pain improved after injection  - If neuroma recurs, MRI will be recommended for further evaluation  - Monitoring for recurrence and potential MRI    5. Health maintenance  - Scheduled physical exam on 07/17/2025, during which a Pap  smear will be performed  - Advised to complete fasting labs, including CBC, metabolic panel, A1c, lipid panel, and vitamin D, before the physical    Follow-up in July for blood test follow-up, annual for Pap      Chief complaint::The primary encounter diagnosis was Hematoma of right lower leg. Diagnoses of Achilles tendon disorder, right, Polyarthralgia, and Mendez's neuroma of left foot were also pertinent to this visit.      History of Present Illness  The patient is a 45-year-old female who presents for evaluation of leg and foot pain.    Leg and Foot Pain  - Under the care of a sports medicine specialist, Dr. Chad Blanco  - Initially suspected to be an Achilles tendon issue, but further investigation revealed an internal injury or pain.  - Ultrasound in 03/2025 showed minor internal bleeding and a hematoma.  - Pain has improved but exacerbates with minimal exertion.  - Experiences recurrent pain despite engaging in low-impact activities like swimming.  - Concerned about potential overuse of other muscles due to the pain.  - Symptoms evolved from severe spasms at night to a different type of pain.  - Reports acute pain on the inner calf, transitioning to a general ache after two days of rest.  - Worried about the possibility of the pain spreading.  - Has not taken any medication for the past two days, with the last dose of Tylenol for leg pain.  - Made dietary modifications, including eliminating gluten and alcohol, and reducing sugar intake.  - Advised to keep moving and massage the area, but recently recommended to use a boot.    Mendez's Neuroma  - Diagnosed with Mendez's neuroma in the left foot, causing significant pain from the end of 03/2025 to three weeks ago.  - Received an injection from Dr. Blanco, which provided relief after 10 days.  - Concerned about potential flare-ups.     She has not yet undergone a Pap smear but has a physical examination scheduled for 07/17/2025. She is uncertain about the  "need for fasting labs.    FAMILY HISTORY  She does not report a family history of psoriatic arthritis or psoriasis.      Review of Systems   Constitutional:  Negative for chills and fever.   Cardiovascular:  Negative for chest pain, palpitations and leg swelling.   Musculoskeletal:         Pain at right Achilles tendon and right lower leg          Medications and Allergies:       Current Outpatient Medications:     TURMERIC PO, Take  by mouth., Taking    multivitamin, 1 Tablet, Oral, DAILY, Taking    MAGNESIUM GLYCINATE PO, Take  by mouth., Taking    Cholecalciferol (VITAMIN D-3 PO), Take  by mouth., Taking    buPROPion, 300 mg, Oral, QAM, Taking     /68 (BP Location: Left arm, Patient Position: Sitting, BP Cuff Size: Adult)   Pulse (!) 107   Temp 37.3 °C (99.2 °F) (Temporal)   Ht 1.619 m (5' 3.75\")   Wt 63.7 kg (140 lb 6.9 oz)   SpO2 98% , Body mass index is 24.29 kg/m².      Physical Exam  Constitutional:       Appearance: Normal appearance. She is well-developed and well-groomed.   HENT:      Head: Normocephalic and atraumatic.      Right Ear: External ear normal.      Left Ear: External ear normal.   Eyes:      General:         Right eye: No discharge.         Left eye: No discharge.      Conjunctiva/sclera: Conjunctivae normal.   Cardiovascular:      Rate and Rhythm: Normal rate.   Pulmonary:      Effort: Pulmonary effort is normal. No respiratory distress.   Musculoskeletal:      Cervical back: Neck supple.   Skin:     Findings: No rash.   Neurological:      Mental Status: She is alert.   Psychiatric:         Mood and Affect: Mood and affect normal.         Behavior: Behavior normal.              Please note that this dictation was created using voice recognition software. I have made every reasonable attempt to correct obvious errors, but I expect that there are errors of grammar and possibly content that I did not discover before finalizing the note.      "

## 2025-06-18 NOTE — Clinical Note
REFERRAL APPROVAL NOTICE         Sent on June 18, 2025                   Charlette Gomez  [address] [facility] [address]                   Dear Ms. Gomez,    After a careful review of the medical information and benefit coverage, [facility] has processed your referral. See below for additional details.    If applicable, you must be actively enrolled with your insurance for coverage of the authorized service. If you have any questions regarding your coverage, please contact your insurance directly.    REFERRAL INFORMATION   Referral #:  [id number]  Referred-To Department    Referred-By Provider:  Orthopedics    Jared Barrios M.D.   [facility]      [address]  [address]  [facility] [address]  [phone] [address] [place] [id number] [phone]    Referral Start Date:  06/18/2025  Referral End Date:   06/18/2026             SCHEDULING  If you do not already have an appointment, please call [phone] to make an appointment.     MORE INFORMATION  If you do not already have a [employer] account, sign up at: [email or web address]  You can access your medical information, make appointments, see lab results, billing information, and more.  If you have questions regarding this referral, please contact  the [facility] Referrals department at:             [phone]. Monday - Friday 8:00AM - 5:00PM.     Sincerely,    [facility]

## 2025-07-01 DIAGNOSIS — R70.0 ESR RAISED: ICD-10-CM

## 2025-07-01 DIAGNOSIS — R79.82 CRP ELEVATED: ICD-10-CM

## 2025-07-01 DIAGNOSIS — M25.50 POLYARTHRALGIA: Primary | ICD-10-CM

## 2025-07-01 LAB — TEST NAME 95000: NORMAL

## 2025-07-02 NOTE — Clinical Note
REFERRAL APPROVAL NOTICE         Sent on July 2, 2025                   Charlette Gomez  [address] [place] [address]                   Dear Ms. Gomez,    After a careful review of the medical information and benefit coverage, [facility] has processed your referral. See below for additional details.    If applicable, you must be actively enrolled with your insurance for coverage of the authorized service. If you have any questions regarding your coverage, please contact your insurance directly.    REFERRAL INFORMATION   Referral #:  [id number]  Referred-To Provider    Referred-By Provider:  Rheumatology    RILEY Ballard TAMIKO R      [address]  [address]  [place] [address]  [phone] [address]    [address] [place] [id number]  [phone]    Referral Start Date:  07/01/2025  Referral End Date:   07/01/2026             SCHEDULING  If you do not already have an appointment, please call [phone] to make an appointment.     MORE INFORMATION  If you do not already have a itravel account, sign up at: [email or web address]  You can access your medical information, make appointments, see lab results, billing information, and more.  If you have questions regarding this referral, please contact  the [facility] Referrals department at:             [phone]. Monday - Friday 8:00AM - 5:00PM.     Sincerely,    [facility]

## 2025-07-11 ENCOUNTER — APPOINTMENT (OUTPATIENT)
Dept: SPORTS MEDICINE | Facility: OTHER | Age: 45
End: 2025-07-11
Payer: COMMERCIAL

## 2025-07-28 ENCOUNTER — HOSPITAL ENCOUNTER (OUTPATIENT)
Dept: LAB | Facility: MEDICAL CENTER | Age: 45
End: 2025-07-28
Attending: FAMILY MEDICINE
Payer: COMMERCIAL

## 2025-07-28 DIAGNOSIS — Z13.1 SCREENING FOR DIABETES MELLITUS: ICD-10-CM

## 2025-07-28 DIAGNOSIS — R53.83 OTHER FATIGUE: ICD-10-CM

## 2025-07-28 DIAGNOSIS — Z13.220 SCREENING FOR LIPID DISORDERS: ICD-10-CM

## 2025-07-28 LAB
25(OH)D3 SERPL-MCNC: 28 NG/ML (ref 30–100)
ALBUMIN SERPL BCP-MCNC: 3.8 G/DL (ref 3.2–4.9)
ALBUMIN/GLOB SERPL: 1.1 G/DL
ALP SERPL-CCNC: 72 U/L (ref 30–99)
ALT SERPL-CCNC: 20 U/L (ref 2–50)
ANION GAP SERPL CALC-SCNC: 12 MMOL/L (ref 7–16)
AST SERPL-CCNC: 24 U/L (ref 12–45)
BASOPHILS # BLD AUTO: 0.3 % (ref 0–1.8)
BASOPHILS # BLD: 0.03 K/UL (ref 0–0.12)
BILIRUB SERPL-MCNC: 0.4 MG/DL (ref 0.1–1.5)
BUN SERPL-MCNC: 10 MG/DL (ref 8–22)
CALCIUM ALBUM COR SERPL-MCNC: 9.4 MG/DL (ref 8.5–10.5)
CALCIUM SERPL-MCNC: 9.2 MG/DL (ref 8.5–10.5)
CHLORIDE SERPL-SCNC: 101 MMOL/L (ref 96–112)
CHOLEST SERPL-MCNC: 140 MG/DL (ref 100–199)
CO2 SERPL-SCNC: 24 MMOL/L (ref 20–33)
CREAT SERPL-MCNC: 0.68 MG/DL (ref 0.5–1.4)
EOSINOPHIL # BLD AUTO: 0.06 K/UL (ref 0–0.51)
EOSINOPHIL NFR BLD: 0.7 % (ref 0–6.9)
ERYTHROCYTE [DISTWIDTH] IN BLOOD BY AUTOMATED COUNT: 45.9 FL (ref 35.9–50)
EST. AVERAGE GLUCOSE BLD GHB EST-MCNC: 117 MG/DL
FASTING STATUS PATIENT QL REPORTED: NORMAL
GFR SERPLBLD CREATININE-BSD FMLA CKD-EPI: 110 ML/MIN/1.73 M 2
GLOBULIN SER CALC-MCNC: 3.6 G/DL (ref 1.9–3.5)
GLUCOSE SERPL-MCNC: 89 MG/DL (ref 65–99)
HBA1C MFR BLD: 5.7 % (ref 4–5.6)
HCT VFR BLD AUTO: 37.1 % (ref 37–47)
HDLC SERPL-MCNC: 49 MG/DL
HGB BLD-MCNC: 11.6 G/DL (ref 12–16)
IMM GRANULOCYTES # BLD AUTO: 0.03 K/UL (ref 0–0.11)
IMM GRANULOCYTES NFR BLD AUTO: 0.3 % (ref 0–0.9)
LDLC SERPL CALC-MCNC: 80 MG/DL
LYMPHOCYTES # BLD AUTO: 1.99 K/UL (ref 1–4.8)
LYMPHOCYTES NFR BLD: 21.8 % (ref 22–41)
MCH RBC QN AUTO: 28.6 PG (ref 27–33)
MCHC RBC AUTO-ENTMCNC: 31.3 G/DL (ref 32.2–35.5)
MCV RBC AUTO: 91.4 FL (ref 81.4–97.8)
MONOCYTES # BLD AUTO: 0.57 K/UL (ref 0–0.85)
MONOCYTES NFR BLD AUTO: 6.3 % (ref 0–13.4)
NEUTROPHILS # BLD AUTO: 6.43 K/UL (ref 1.82–7.42)
NEUTROPHILS NFR BLD: 70.6 % (ref 44–72)
NRBC # BLD AUTO: 0 K/UL
NRBC BLD-RTO: 0 /100 WBC (ref 0–0.2)
PLATELET # BLD AUTO: 329 K/UL (ref 164–446)
PMV BLD AUTO: 10.2 FL (ref 9–12.9)
POTASSIUM SERPL-SCNC: 4.5 MMOL/L (ref 3.6–5.5)
PROT SERPL-MCNC: 7.4 G/DL (ref 6–8.2)
RBC # BLD AUTO: 4.06 M/UL (ref 4.2–5.4)
SODIUM SERPL-SCNC: 137 MMOL/L (ref 135–145)
T4 FREE SERPL-MCNC: 1.17 NG/DL (ref 0.93–1.7)
TRIGL SERPL-MCNC: 55 MG/DL (ref 0–149)
TSH SERPL-ACNC: 2.12 UIU/ML (ref 0.38–5.33)
VIT B12 SERPL-MCNC: 734 PG/ML (ref 211–911)
WBC # BLD AUTO: 9.1 K/UL (ref 4.8–10.8)

## 2025-07-28 PROCEDURE — 36415 COLL VENOUS BLD VENIPUNCTURE: CPT

## 2025-07-28 PROCEDURE — 82306 VITAMIN D 25 HYDROXY: CPT

## 2025-07-28 PROCEDURE — 86200 CCP ANTIBODY: CPT

## 2025-07-28 PROCEDURE — 84443 ASSAY THYROID STIM HORMONE: CPT

## 2025-07-28 PROCEDURE — 80061 LIPID PANEL: CPT

## 2025-07-28 PROCEDURE — 83036 HEMOGLOBIN GLYCOSYLATED A1C: CPT

## 2025-07-28 PROCEDURE — 80053 COMPREHEN METABOLIC PANEL: CPT

## 2025-07-28 PROCEDURE — 84439 ASSAY OF FREE THYROXINE: CPT

## 2025-07-28 PROCEDURE — 82607 VITAMIN B-12: CPT

## 2025-07-28 PROCEDURE — 85025 COMPLETE CBC W/AUTO DIFF WBC: CPT

## 2025-07-30 LAB — CCP IGA+IGG SERPL IA-ACNC: 3 UNITS (ref 0–19)

## 2025-08-01 ENCOUNTER — APPOINTMENT (OUTPATIENT)
Dept: MEDICAL GROUP | Facility: MEDICAL CENTER | Age: 45
End: 2025-08-01
Payer: COMMERCIAL

## 2025-08-01 ENCOUNTER — HOSPITAL ENCOUNTER (OUTPATIENT)
Facility: MEDICAL CENTER | Age: 45
End: 2025-08-01
Attending: FAMILY MEDICINE
Payer: COMMERCIAL

## 2025-08-01 DIAGNOSIS — Z12.4 SCREENING FOR MALIGNANT NEOPLASM OF CERVIX: ICD-10-CM

## 2025-08-01 PROBLEM — M25.521 RIGHT ELBOW PAIN: Status: ACTIVE | Noted: 2025-08-01

## 2025-08-01 PROBLEM — D64.9 ANEMIA: Status: ACTIVE | Noted: 2025-08-01

## 2025-08-01 PROCEDURE — 87624 HPV HI-RISK TYP POOLED RSLT: CPT

## 2025-08-01 PROCEDURE — 88175 CYTOPATH C/V AUTO FLUID REDO: CPT

## 2025-08-01 ASSESSMENT — ENCOUNTER SYMPTOMS
PALPITATIONS: 0
FOCAL WEAKNESS: 0
NAUSEA: 0
EYE PAIN: 0
NERVOUS/ANXIOUS: 0
FEVER: 0
ABDOMINAL PAIN: 0
EYE REDNESS: 0
CONSTIPATION: 0
SPUTUM PRODUCTION: 0
SORE THROAT: 0
COUGH: 0
EYE DISCHARGE: 0
DEPRESSION: 0
CHILLS: 0
DIARRHEA: 0
WHEEZING: 0
MYALGIAS: 0
SINUS PAIN: 0
HEMOPTYSIS: 0
DIZZINESS: 0
SENSORY CHANGE: 0
HEADACHES: 0
SHORTNESS OF BREATH: 0
VOMITING: 0

## 2025-08-01 ASSESSMENT — FIBROSIS 4 INDEX: FIB4 SCORE: 0.7

## 2025-08-03 LAB
HPV I/H RISK 1 DNA SPEC QL NAA+PROBE: NOT DETECTED
SPECIMEN SOURCE: NORMAL

## 2025-08-05 LAB — THINPREP PAP, CYTOLOGY NL11781: NORMAL
